# Patient Record
Sex: FEMALE | Employment: FULL TIME | URBAN - METROPOLITAN AREA
[De-identification: names, ages, dates, MRNs, and addresses within clinical notes are randomized per-mention and may not be internally consistent; named-entity substitution may affect disease eponyms.]

---

## 2024-08-12 ENCOUNTER — OFFICE VISIT (OUTPATIENT)
Dept: INTERNAL MEDICINE CLINIC | Facility: CLINIC | Age: 34
End: 2024-08-12

## 2024-08-12 VITALS
WEIGHT: 263 LBS | SYSTOLIC BLOOD PRESSURE: 115 MMHG | HEIGHT: 66 IN | TEMPERATURE: 97.6 F | BODY MASS INDEX: 42.27 KG/M2 | DIASTOLIC BLOOD PRESSURE: 76 MMHG | HEART RATE: 76 BPM

## 2024-08-12 DIAGNOSIS — Z12.4 CERVICAL CANCER SCREENING: ICD-10-CM

## 2024-08-12 DIAGNOSIS — E66.01 CLASS 3 SEVERE OBESITY DUE TO EXCESS CALORIES WITH BODY MASS INDEX (BMI) OF 40.0 TO 44.9 IN ADULT, UNSPECIFIED WHETHER SERIOUS COMORBIDITY PRESENT (HCC): ICD-10-CM

## 2024-08-12 DIAGNOSIS — I87.2 VENOUS INSUFFICIENCY OF LOWER EXTREMITY: ICD-10-CM

## 2024-08-12 DIAGNOSIS — D68.9 COAGULATION DISORDER (HCC): Primary | ICD-10-CM

## 2024-08-12 DIAGNOSIS — I73.9 ARTERIAL INSUFFICIENCY OF LOWER EXTREMITY (HCC): ICD-10-CM

## 2024-08-12 PROBLEM — E66.813 CLASS 3 SEVERE OBESITY DUE TO EXCESS CALORIES WITH BODY MASS INDEX (BMI) OF 40.0 TO 44.9 IN ADULT (HCC): Status: ACTIVE | Noted: 2024-08-12

## 2024-08-12 PROBLEM — Z86.718 HISTORY OF DVT IN ADULTHOOD: Status: ACTIVE | Noted: 2024-08-12

## 2024-08-12 PROCEDURE — 99204 OFFICE O/P NEW MOD 45 MIN: CPT | Performed by: FAMILY MEDICINE

## 2024-08-12 RX ORDER — CLOPIDOGREL BISULFATE 75 MG/1
75 TABLET ORAL DAILY
COMMUNITY

## 2024-08-12 NOTE — PROGRESS NOTES
Ambulatory Visit  Name: Lindsay Oneil      : 1990      MRN: [id number]  Encounter Provider: Lisa Smith MD  Encounter Date: 2024   Encounter department: [facility]    Assessment & Plan   1. Coagulation disorder (HCC)  Assessment & Plan:  Information gathering was very difficult during this office visit , languages barrier ,it was extremely difficult to direct patient , chronology of her hospital doctor visits , vein surgery didn't line up and I had to go back and review with her multiple times   She was showing me words on her phone , venous and arterial insufficiency   She had ED visit 2023 CP , HA dizziness she had  + D dimer and then thromboelastogram testing . NO imaging of chest or legs done at that time She was told her blood was thick and that she had venous and arterial insufficiency  started blood thinner , warfarin or the like she was having labs monitored . She at some point had LE imaging and then LE vein surgery in 2023 She was kept on warfarin, was told she needed treatment for life , then later took it x 5 months then was taken off the medication . She saw a doctor 2 months ago , had updated labs + D dimer and additional labs fibrinogen abnormal She was started on Plavix and has been taking it daily   I will have her see hematology , she is agreeable to this referral placed   Orders:  -     Ambulatory Referral to Hematology / Oncology; Future  2. Class 3 severe obesity due to excess calories with body mass index (BMI) of 40.0 to 44.9 in adult, unspecified whether serious comorbidity present (HCC)  3. Venous insufficiency of lower extremity  -     Ambulatory Referral to Hematology / Oncology; Future  4. Arterial insufficiency of lower extremity (HCC)  -     Ambulatory Referral to Hematology / Oncology; Future  5. Cervical cancer screening  Assessment & Plan:  Pt is due for pap , gyn referral placed   Orders:  -     Ambulatory  Referral to Obstetrics / Gynecology; Future         History of Present Illness     HPINew pt here to establish care , interpretor # [id number] present during office visit moved here from she has hx DVT  7/17/2023 , she had CP , HA , dizziness and she was taken to ED , 4/21/23 she had elevated D dimer , she had further work up thrombo elastrogram blood tests , she was told her blood was thick she was told she had venous and  arterial insufficiency , she saw a specialist in the ED she had surgery veins taken out of both lower legs ( she had no leg pain when she went to ED )She didn't have CT of chest , she did have LE imaging later , she had the surgery 7/17/2023 she was in the hospital x 3 days. She was placed on blood thinner 4/2023 she stopped med 2 days prior to surgery in July 2023  , she was restarted on blood thinner 6 days post op ? Warfarin she had monitoring  of the medication She was told she would need treatment for life , then later took the med daily x 5  months , then it was stopped . She saw a doctor 2 months ago , she had labs repeated D dimer was elevated , and additional labs fibrinogen was abnormal and medication was resumed  pt is difficult to direct during   She was told she had this coagulation issue was a result   of COVID vaccine she had the [employer] 2 dose   Fam history negative for blood clots or coagulation disorder   Review of Systems   Constitutional:  Negative for chills and fever.   HENT:  Negative for ear pain and sore throat.    Eyes:  Negative for pain and visual disturbance.   Respiratory:  Negative for cough and shortness of breath.    Cardiovascular:  Negative for chest pain and palpitations.   Gastrointestinal:  Negative for abdominal pain and vomiting.   Genitourinary:  Negative for dysuria and hematuria.   Musculoskeletal:  Negative for arthralgias and back pain.   Skin:  Negative for color change and rash.   Neurological:  Negative for seizures and syncope.   All other systems  "reviewed and are negative.    Past Medical History:   Diagnosis Date    Clotting disorder (HCC)      Past Surgical History:   Procedure Laterality Date     SECTION       Family History   Problem Relation Age of Onset    Asthma Mother     No Known Problems Father     No Known Problems Son     No Known Problems Daughter     No Known Problems Daughter      Social History     Tobacco Use    Smoking status: Never     Passive exposure: Never    Smokeless tobacco: Never   Vaping Use    Vaping status: Never Used   Substance and Sexual Activity    Alcohol use: Never    Drug use: Never    Sexual activity: Not on file     Current Outpatient Medications on File Prior to Visit   Medication Sig    clopidogrel (PLAVIX) 75 mg tablet Take 75 mg by mouth daily Plavix 75 mg once a day     No Known Allergies    There is no immunization history on file for this patient.  Objective     /76 (BP Location: Right arm, Patient Position: Sitting, Cuff Size: Large)   Pulse 76   Temp 97.6 °F (36.4 °C) (Temporal)   Ht 5' 5.5\" (1.664 m)   Wt 119 kg (263 lb)   BMI 43.10 kg/m²     Physical Exam  Vitals and nursing note reviewed.   Constitutional:       General: She is not in acute distress.     Appearance: She is well-developed.      Comments: Skin with good color turgor , well hydrated ,no distress noted  obese    HENT:      Head: Normocephalic and atraumatic.      Right Ear: No decreased hearing noted.      Left Ear: No decreased hearing noted.   Eyes:      Conjunctiva/sclera: Conjunctivae normal.   Neck:      Thyroid: No thyromegaly.   Cardiovascular:      Rate and Rhythm: Normal rate and regular rhythm.      Heart sounds: Normal heart sounds. No murmur heard.  Pulmonary:      Effort: Pulmonary effort is normal. No respiratory distress.      Breath sounds: Normal breath sounds.   Abdominal:      Palpations: Abdomen is soft.      Tenderness: There is no abdominal tenderness.   Musculoskeletal:         General: No swelling.      " Cervical back: Neck supple.      Right lower leg: No swelling. No edema.      Left lower leg: No swelling. No edema.      Comments: Bilateral Medial lower legs scars from prior vein surgery   Lymphadenopathy:      Cervical:      Right cervical: No superficial cervical adenopathy.     Left cervical: No superficial cervical adenopathy.   Skin:     General: Skin is warm and dry.      Capillary Refill: Capillary refill takes less than 2 seconds.   Neurological:      Mental Status: She is alert.      Comments: Non focal exam    Psychiatric:         Attention and Perception: Attention normal.         Mood and Affect: Mood normal.         Speech: Speech normal.         Behavior: Behavior normal.         Thought Content: Thought content normal.

## 2024-08-13 PROBLEM — Z12.4 CERVICAL CANCER SCREENING: Status: ACTIVE | Noted: 2024-08-13

## 2024-08-13 PROBLEM — Z86.718 HISTORY OF DVT IN ADULTHOOD: Status: RESOLVED | Noted: 2024-08-12 | Resolved: 2024-08-13

## 2024-08-13 NOTE — ASSESSMENT & PLAN NOTE
Information gathering was very difficult during this office visit , languages barrier ,it was extremely difficult to direct patient , chronology of her hospital doctor visits , vein surgery didn't line up and I had to go back and review with her multiple times   She was showing me words on her phone , venous and arterial insufficiency   She had ED visit 4/2023 CP , HA dizziness she had  + D dimer and then thromboelastogram testing . NO imaging of chest or legs done at that time She was told her blood was thick and that she had venous and arterial insufficiency  started blood thinner , warfarin or the like she was having labs monitored . She at some point had LE imaging and then LE vein surgery in July 2023 She was kept on warfarin, was told she needed treatment for life , then later took it x 5 months then was taken off the medication . She saw a doctor 2 months ago , had updated labs + D dimer and additional labs fibrinogen abnormal She was started on Plavix and has been taking it daily   I will have her see hematology , she is agreeable to this referral placed

## 2024-09-12 PROBLEM — Z12.4 CERVICAL CANCER SCREENING: Status: RESOLVED | Noted: 2024-08-13 | Resolved: 2024-09-12

## 2024-10-09 ENCOUNTER — TELEPHONE (OUTPATIENT)
Dept: HEMATOLOGY ONCOLOGY | Facility: CLINIC | Age: 34
End: 2024-10-09

## 2024-10-09 NOTE — TELEPHONE ENCOUNTER
Left  for pt. Pt's consult needed to be scheduled w/ a MD not a PA/NP. Pt has been rs to Dr. Arvizu for 10/15 @ 1040 am. Still located at [facility]. Left [place] line # for pt to confirm or rs this appt.

## 2024-10-15 ENCOUNTER — APPOINTMENT (OUTPATIENT)
Dept: LAB | Facility: CLINIC | Age: 34
End: 2024-10-15

## 2024-10-15 ENCOUNTER — CONSULT (OUTPATIENT)
Dept: HEMATOLOGY ONCOLOGY | Facility: CLINIC | Age: 34
End: 2024-10-15

## 2024-10-15 VITALS
DIASTOLIC BLOOD PRESSURE: 78 MMHG | TEMPERATURE: 97.5 F | HEART RATE: 96 BPM | SYSTOLIC BLOOD PRESSURE: 120 MMHG | WEIGHT: 260 LBS | RESPIRATION RATE: 18 BRPM | BODY MASS INDEX: 43.32 KG/M2 | OXYGEN SATURATION: 97 % | HEIGHT: 65 IN

## 2024-10-15 DIAGNOSIS — I82.90 VTE (VENOUS THROMBOEMBOLISM): Primary | ICD-10-CM

## 2024-10-15 DIAGNOSIS — I87.2 VENOUS INSUFFICIENCY OF LOWER EXTREMITY: ICD-10-CM

## 2024-10-15 DIAGNOSIS — D68.9 COAGULATION DISORDER (HCC): ICD-10-CM

## 2024-10-15 DIAGNOSIS — I73.9 ARTERIAL INSUFFICIENCY OF LOWER EXTREMITY (HCC): ICD-10-CM

## 2024-10-15 DIAGNOSIS — N92.0 MENORRHAGIA WITH REGULAR CYCLE: ICD-10-CM

## 2024-10-15 DIAGNOSIS — I82.90 VTE (VENOUS THROMBOEMBOLISM): ICD-10-CM

## 2024-10-15 LAB
ALBUMIN SERPL BCG-MCNC: 4.1 G/DL (ref 3.5–5)
ALP SERPL-CCNC: 49 U/L (ref 34–104)
ALT SERPL W P-5'-P-CCNC: 17 U/L (ref 7–52)
ANION GAP SERPL CALCULATED.3IONS-SCNC: 9 MMOL/L (ref 4–13)
AST SERPL W P-5'-P-CCNC: 16 U/L (ref 13–39)
BASOPHILS # BLD AUTO: 0.01 THOUSANDS/ΜL (ref 0–0.1)
BASOPHILS NFR BLD AUTO: 0 % (ref 0–1)
BILIRUB SERPL-MCNC: 0.41 MG/DL (ref 0.2–1)
BUN SERPL-MCNC: 6 MG/DL (ref 5–25)
CALCIUM SERPL-MCNC: 8.9 MG/DL (ref 8.4–10.2)
CHLORIDE SERPL-SCNC: 106 MMOL/L (ref 96–108)
CO2 SERPL-SCNC: 25 MMOL/L (ref 21–32)
CREAT SERPL-MCNC: 0.48 MG/DL (ref 0.6–1.3)
EOSINOPHIL # BLD AUTO: 0.11 THOUSAND/ΜL (ref 0–0.61)
EOSINOPHIL NFR BLD AUTO: 2 % (ref 0–6)
ERYTHROCYTE [DISTWIDTH] IN BLOOD BY AUTOMATED COUNT: 13.5 % (ref 11.6–15.1)
FERRITIN SERPL-MCNC: 14 NG/ML (ref 11–307)
GFR SERPL CREATININE-BSD FRML MDRD: 130 ML/MIN/1.73SQ M
GLUCOSE SERPL-MCNC: 79 MG/DL (ref 65–140)
HCT VFR BLD AUTO: 37.2 % (ref 34.8–46.1)
HGB BLD-MCNC: 12.4 G/DL (ref 11.5–15.4)
IMM GRANULOCYTES # BLD AUTO: 0.02 THOUSAND/UL (ref 0–0.2)
IMM GRANULOCYTES NFR BLD AUTO: 0 % (ref 0–2)
IRON SATN MFR SERPL: 17 % (ref 15–50)
IRON SERPL-MCNC: 71 UG/DL (ref 50–212)
LYMPHOCYTES # BLD AUTO: 2 THOUSANDS/ΜL (ref 0.6–4.47)
LYMPHOCYTES NFR BLD AUTO: 38 % (ref 14–44)
MCH RBC QN AUTO: 28.7 PG (ref 26.8–34.3)
MCHC RBC AUTO-ENTMCNC: 33.3 G/DL (ref 31.4–37.4)
MCV RBC AUTO: 86 FL (ref 82–98)
MONOCYTES # BLD AUTO: 0.34 THOUSAND/ΜL (ref 0.17–1.22)
MONOCYTES NFR BLD AUTO: 6 % (ref 4–12)
NEUTROPHILS # BLD AUTO: 2.86 THOUSANDS/ΜL (ref 1.85–7.62)
NEUTS SEG NFR BLD AUTO: 54 % (ref 43–75)
NRBC BLD AUTO-RTO: 0 /100 WBCS
PLATELET # BLD AUTO: 268 THOUSANDS/UL (ref 149–390)
PMV BLD AUTO: 11.9 FL (ref 8.9–12.7)
POTASSIUM SERPL-SCNC: 4 MMOL/L (ref 3.5–5.3)
PROT SERPL-MCNC: 7.4 G/DL (ref 6.4–8.4)
RBC # BLD AUTO: 4.32 MILLION/UL (ref 3.81–5.12)
SODIUM SERPL-SCNC: 140 MMOL/L (ref 135–147)
TIBC SERPL-MCNC: 428 UG/DL (ref 250–450)
UIBC SERPL-MCNC: 357 UG/DL (ref 155–355)
WBC # BLD AUTO: 5.34 THOUSAND/UL (ref 4.31–10.16)

## 2024-10-15 PROCEDURE — 80053 COMPREHEN METABOLIC PANEL: CPT

## 2024-10-15 PROCEDURE — 85613 RUSSELL VIPER VENOM DILUTED: CPT

## 2024-10-15 PROCEDURE — 85732 THROMBOPLASTIN TIME PARTIAL: CPT

## 2024-10-15 PROCEDURE — 85705 THROMBOPLASTIN INHIBITION: CPT

## 2024-10-15 PROCEDURE — 86146 BETA-2 GLYCOPROTEIN ANTIBODY: CPT

## 2024-10-15 PROCEDURE — 83550 IRON BINDING TEST: CPT

## 2024-10-15 PROCEDURE — 99244 OFF/OP CNSLTJ NEW/EST MOD 40: CPT | Performed by: INTERNAL MEDICINE

## 2024-10-15 PROCEDURE — 85670 THROMBIN TIME PLASMA: CPT

## 2024-10-15 PROCEDURE — 83540 ASSAY OF IRON: CPT

## 2024-10-15 PROCEDURE — 82728 ASSAY OF FERRITIN: CPT

## 2024-10-15 PROCEDURE — 85025 COMPLETE CBC W/AUTO DIFF WBC: CPT

## 2024-10-15 PROCEDURE — 86147 CARDIOLIPIN ANTIBODY EA IG: CPT

## 2024-10-15 PROCEDURE — 36415 COLL VENOUS BLD VENIPUNCTURE: CPT

## 2024-10-15 NOTE — PROGRESS NOTES
Ambulatory Visit  Name: Lindsay Oneil      : 1990      MRN: [id number]  Encounter Provider: Buffy Arvizu MD  Encounter Date: 10/15/2024   Encounter department: [facility] [place]    Assessment & Plan  VTE (venous thromboembolism)         Venous insufficiency of lower extremity         Arterial insufficiency of lower extremity (HCC)           History of Present Illness   {?Quick Links Encounters * My Last Note * Last Note in Specialty * Snapshot * Since Last Visit * History :35676}  Lindsay Oneil is a 33 y.o. female who presents ***      Episode of CP, HA, dizziness evaluated in the ED on 23. She had elevated D dimer, she had further work up including thrombo elastrogram blood tests. She was told that her blood was hypercoagulable. Also, she was told she had venous and  arterial insufficiency. Initiated systemic anticoagulation in 2023   History of DVT on 2023. Admitted to the hospital for 3 days surgical procedure of the LE venous system. Two days before surgery anticoagulation was held. Re-started anticoagulation on post-op day 6.  She was prescribed Warfarin. She was told she would need lifelong anticoagulation. Subsequently, she took Warfarin daily x 5  months, then it was stopped. She was told she had hypercoagulability secondary to COVID-19 vaccination ([employer] vaccine X2 doses)     {History obtained from (Optional):83690}  Review of Systems  {Select to Display PMH (Optional):92018}      Objective   {?Quick Links Trend Vitals * Enter New Vitals * Results Review * Timeline (Adult) * Labs * Imaging * Cardiology * Procedures * Lung Cancer Screening * Surgical eConsent :56085}  There were no vitals taken for this visit.    Physical Exam  {Administrative / Billing Section (Optional):00214}

## 2024-10-15 NOTE — PROGRESS NOTES
HEMATOLOGY ONCOLOGY STAFF PHYSICIAN ATTESTATION   I have personally interviewed and examined Lindsay Oneil with  Dr. Sonu Velasquez. I have reviewed and discussed the HPI, assessment, and oncologic management plan formulated by Dr. Velasquez. I concur with his assessment and management plan.    HPI was obtained form the patient in [place] [facility]. No previous medical records for review.    In summary, Mrs. Mcduffie is a 33-year-old  female referred to hematology clinic for diagnostic workup of acquired hypercoagulable state.  In summary, the patient refers an episode of right lower extremity DVT, unprovoked in April 2023.  At the time she was admitted to a hospital in the [facility].  She initiated systemic anticoagulation with unfractionated heparin and started oral apixaban as well.  She was discharged from the hospital on oral apixaban and was advised regarding the need for surgical resection of the saphenous veins bilaterally.  Sometime in July 2023, the patient was readmitted to the hospital for resection of the right and left saphenous veins.  She had extensive lower extremity vascular surgery (bilateral resection of saphenous veins) at a hospital in [facility]. On post-operative day 6, the patient developed a venous thromboembolic event with presence of a blood clot in the heart and angina pectoris. She was informed that she had developed unstable angina/acute coronary artery ischemia. Patient received systemic anticoagulation with unfractionated heparin and after clinical stabilization she was discharged to continue treatment with apixaban.  For the past 3 months she has been off apixiban and has been treated with the antiplatelet agent  (clopidogrel) Plavix as she was recently found to have elevation of the D-dimer. After July 2023, the patient has not had further episodes of acute venous thromboembolic disease or acute arterial thrombotic events.    Assessment and Plan:  33-year-old   female with history of unprovoked venous thromboembolic disease of the right lower extremity and by history probable ischemia of the coronary arteries/angina pectoris/intracardiac thrombus.  Her clinical presentation is suggestive of an acquired hypercoagulable state. She has indication for testing for antiphospholipid antibody syndrome. She does not have family history of hypercoagulability and therefore she does not have indication for testing for familial hypercoagulable syndromes.  The patient will return to hematology clinic to review results of testing for antiphospholipid antibody syndrome in 4 weeks.  Currently, she does not have indication for systemic anticoagulation or antiplatelet therapy.    Patient understands and agrees with our management recommendations and plan of care. We answered questions to her satisfaction.

## 2024-10-15 NOTE — PROGRESS NOTES
Hematology/Oncology Outpatient Initial Consultation  Lindsay Oneil 33 y.o. female 1990 [id number]    Date:  10/15/2024  Initial Consultation: Coagulopathy  HPI:    33-year-old female morbid obesity BMI 43, venous insufficiency with prior lower extremity vein surgery 2023, arterial insufficiency of lower extremities, and history of bilateral lower extremity DVT presents for initial consultation for anticoagulation recommendation.    Patient initially presented to ED in [facility] 2023 with chief complaint chest pain, headache, and dizziness with noted positive D-dimer and patient reported ultrasound with bilateral significantly sized blood clots in her deep veins.  The patient stated that she was started on Eliquis until 2023 where she had lower extremity vascular surgery with vein removal, patient showed us pictures of lower extremity bruising status post procedure, she did note her procedure was complicated by chest pain unclear if was PE versus CAD requiring heparin infusion.  The patient eventually was transitioned off of full dose anticoagulant and has been taking only Plavix.    The patient stated during, clot she had no provoking factors including trauma, venous stasis, any acute illness/inflammatory symptoms.  She did note that she did receive her COVID-vaccine however this was a year prior to thrombosis episode.  The patient denies any blood clots prior to this.  She also denies any family history of DVT or PE.  She also denies being on any OCPs or pregnant at this time.    The patient does note that she still gets persistent headaches.  She denies any worsening swelling in her lower extremities or chest pain at this time.    ROS: Review of Systems   All other systems reviewed and are negative.      Past Medical History:   Diagnosis Date    Clotting disorder (HCC)        Past Surgical History:   Procedure Laterality Date     SECTION         Social History      Socioeconomic History    Marital status: Single     Spouse name: Not on file    Number of children: 3    Years of education: Not on file    Highest education level: Not on file   Occupational History    Not on file   Tobacco Use    Smoking status: Never     Passive exposure: Never    Smokeless tobacco: Never   Vaping Use    Vaping status: Never Used   Substance and Sexual Activity    Alcohol use: Never    Drug use: Never    Sexual activity: Not on file   Other Topics Concern    Not on file   Social History Narrative    Not on file     Social Determinants of Health     Financial Resource Strain: Low Risk  (8/12/2024)    Overall Financial Resource Strain (CARDIA)     Difficulty of Paying Living Expenses: Not hard at all   Food Insecurity: No Food Insecurity (8/12/2024)    Hunger Vital Sign     Worried About Running Out of Food in the Last Year: Never true     Ran Out of Food in the Last Year: Never true   Transportation Needs: No Transportation Needs (8/12/2024)    PRAPARE - Transportation     Lack of Transportation (Medical): No     Lack of Transportation (Non-Medical): No   Physical Activity: Not on file   Stress: Not on file   Social Connections: Not on file   Intimate Partner Violence: Not on file   Housing Stability: High Risk (8/12/2024)    Housing Stability Vital Sign     Unable to Pay for Housing in the Last Year: No     Number of Times Moved in the Last Year: 2     Homeless in the Last Year: No       Family History   Problem Relation Age of Onset    Asthma Mother     No Known Problems Father     No Known Problems Son     No Known Problems Daughter     No Known Problems Daughter        No Known Allergies      Current Outpatient Medications:     clopidogrel (PLAVIX) 75 mg tablet, Take 75 mg by mouth daily Plavix 75 mg once a day, Disp: , Rfl:       Physical Exam:  There were no vitals taken for this visit.    Physical Exam  Constitutional:       Appearance: She is obese. She is not ill-appearing.   HENT:     "  Head: Normocephalic and atraumatic.      Mouth/Throat:      Mouth: Mucous membranes are moist.   Eyes:      Extraocular Movements: Extraocular movements intact.      Pupils: Pupils are equal, round, and reactive to light.   Cardiovascular:      Rate and Rhythm: Normal rate and regular rhythm.   Pulmonary:      Effort: Pulmonary effort is normal.      Breath sounds: No wheezing or rales.   Abdominal:      General: Bowel sounds are normal. There is no distension.      Palpations: Abdomen is soft.   Musculoskeletal:         General: No swelling. Normal range of motion.      Cervical back: Normal range of motion.   Skin:     General: Skin is warm.   Neurological:      General: No focal deficit present.      Mental Status: She is oriented to person, place, and time.         Labs:  No results found for: \"WBC\", \"HGB\", \"HCT\", \"MCV\", \"PLT\"  No results found for any previous visit.        Imaging:  No imaging available in system     Assessment and Plan:    1. VTE (venous thromboembolism)  2. Venous insufficiency of lower extremity  3. Arterial insufficiency of lower extremity (HCC)  4. Coagulation disorder (HCC)  5. Menorrhagia with regular cycle      33-year-old female who presents for initial consultation for history of bilateral DVTs/2023 unprovoked it appears with no prior history or family history status post venous surgical intervention of bilateral lower extremities, currently has been on antiplatelet therapy with no signs of recurrent clot.    Discussed with patient given history of DVTs without a provoking factor would rule out antiphospholipid syndrome, ordered cardiolipin antibody, lupus anticoagulant, and beta-2 glycoprotein at this appointment.  Will also check D-dimer. Would defer additional hereditary testing including factor V Leiden given ethnicity also given no family history of blood clots.    Also instructed for patient to stop taking Plavix as feel there is no indication at this time to continue this " medication.    Given persistent headaches, will check CBC, CMP, iron panel given patient has been having heavy menses will rule out anemia, any protein gap, or iron deficiency without anemia.  If headaches persist will consider MRI brain at follow-up appointment.    Sonu Bo,   PGY 4 hematology/oncology fellow    Patient voiced understanding and agreement in the above discussion. Aware to contact our office with questions/symptoms in the interim.     This note has been generated by voice recognition software system.  Therefore, there may be spelling, grammar, and or syntax errors. Please contact if questions arise.

## 2024-10-17 LAB
APTT SCREEN TO CONFIRM RATIO: 1.11 RATIO (ref 0–1.34)
CONFIRM APTT/NORMAL: 42.8 SEC (ref 0–47.6)
LA PPP-IMP: NORMAL
SCREEN APTT: 33.4 SEC (ref 0–43.5)
SCREEN DRVVT: 41.3 SEC (ref 0–47)
THROMBIN TIME: 16.8 SEC (ref 0–23)

## 2024-10-18 LAB
B2 GLYCOPROT1 IGA SERPL IA-ACNC: 3.1
B2 GLYCOPROT1 IGG SERPL IA-ACNC: 1.3
B2 GLYCOPROT1 IGM SERPL IA-ACNC: <2.4
CARDIOLIPIN IGA SER IA-ACNC: 4.7
CARDIOLIPIN IGG SER IA-ACNC: 1.5
CARDIOLIPIN IGM SER IA-ACNC: 0.9

## 2024-11-12 ENCOUNTER — OFFICE VISIT (OUTPATIENT)
Dept: OBGYN CLINIC | Facility: CLINIC | Age: 34
End: 2024-11-12

## 2024-11-12 ENCOUNTER — OFFICE VISIT (OUTPATIENT)
Dept: HEMATOLOGY ONCOLOGY | Facility: CLINIC | Age: 34
End: 2024-11-12

## 2024-11-12 VITALS
SYSTOLIC BLOOD PRESSURE: 133 MMHG | DIASTOLIC BLOOD PRESSURE: 58 MMHG | HEART RATE: 76 BPM | BODY MASS INDEX: 42.82 KG/M2 | HEIGHT: 65 IN | WEIGHT: 257 LBS

## 2024-11-12 VITALS
DIASTOLIC BLOOD PRESSURE: 80 MMHG | RESPIRATION RATE: 18 BRPM | HEIGHT: 65 IN | HEART RATE: 89 BPM | WEIGHT: 261.4 LBS | OXYGEN SATURATION: 98 % | BODY MASS INDEX: 43.55 KG/M2 | SYSTOLIC BLOOD PRESSURE: 118 MMHG | TEMPERATURE: 97.8 F

## 2024-11-12 DIAGNOSIS — I87.2 VENOUS INSUFFICIENCY OF LOWER EXTREMITY: Primary | ICD-10-CM

## 2024-11-12 DIAGNOSIS — Z01.411 ENCOUNTER FOR WELL WOMAN EXAM WITH ABNORMAL FINDINGS: Primary | ICD-10-CM

## 2024-11-12 DIAGNOSIS — N92.0 MENORRHAGIA WITH REGULAR CYCLE: ICD-10-CM

## 2024-11-12 PROCEDURE — G0145 SCR C/V CYTO,THINLAYER,RESCR: HCPCS

## 2024-11-12 PROCEDURE — 99213 OFFICE O/P EST LOW 20 MIN: CPT | Performed by: OBSTETRICS & GYNECOLOGY

## 2024-11-12 PROCEDURE — 99213 OFFICE O/P EST LOW 20 MIN: CPT | Performed by: INTERNAL MEDICINE

## 2024-11-12 PROCEDURE — G0476 HPV COMBO ASSAY CA SCREEN: HCPCS

## 2024-11-12 NOTE — PROGRESS NOTES
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  Her clinical presentation is suggestive of an acquired hypercoagulable state. She has indication for testing for antiphospholipid antibody syndrome. She does not have family history of hypercoagulability and therefore she does not have indication for testing for familial hypercoagulable syndromes.  The patient will return to hematology clinic to review results of testing for antiphospholipid antibody syndrome in 4 weeks.  Currently, she does not have indication for systemic anticoagulation or antiplatelet therapy.    Interim assessment: On 10/15/2024 Lupus Anticoagulant testing was normal. Also, anti-beta 2 glycoprotein antibodies, and anti-cardiolipin antibodies were normal.  The patient does not have clinical or laboratory evidence of antiphospholipid antibody syndrome.  Today, she does not have new symptoms.  She does not have recent or recurrent venous thromboembolic events or episodes of arterial thrombosis.    Plan:  At this point in time, the patient does not have indication for lifelong systemic anticoagulation or antiplatelet therapy  Patient advised regarding lifestyle measures to avoid/minimize the risk of recurrent venous thromboembolic and arterial thrombotic events (avoid prolonged resting, regular exercise, and a balanced diet)  Return to hematology clinic in 1 year       Patient understands and agrees with our management recommendations and plan of care. We answered questions to her satisfaction.      History of Present Illness     Lindsay Mcduffie  Thad is a 34 y.o. female with acquired hypercoagulable state.  In summary, the patient refers an episode of right lower extremity DVT, unprovoked in April 2023.  At the time she was admitted to a hospital in the [facility].  She initiated systemic anticoagulation with unfractionated heparin and started oral apixaban as well.  She was discharged from the hospital on oral apixaban and was advised regarding the need for surgical resection of the saphenous veins bilaterally.  Sometime in July 2023, the patient was readmitted to the hospital for resection of the right and left saphenous veins.  She had extensive lower extremity vascular surgery (bilateral resection of saphenous veins) at a hospital in [facility]. On post-operative day 6, the patient developed a venous thromboembolic event with presence of a blood clot in the heart and angina pectoris. She was informed that she had developed unstable angina/acute coronary artery ischemia. Patient received systemic anticoagulation with unfractionated heparin and after clinical stabilization she was discharged to continue treatment with apixaban.  For the past 3 months she has been off apixiban and has been treated with the antiplatelet agent  (clopidogrel) Plavix as she was recently found to have elevation of the D-dimer. After July 2023, the patient has not had further episodes of acute venous thromboembolic disease or acute arterial thrombotic events.     Interim History: Today Ms. Mcduffie does not have new complaints.  She is doing well.  She denies new systemic, cardiorespiratory, gastrointestinal, gynecologic, genitourinary, musculoskeletal, or neurologic symptoms.  She refers unchanged heavy menstrual periods although she will seek care with with her gynecologist to address this medical issue.  On 10/15/2024 Lupus Anticoagulant testing was normal. Also, anti-beta 2 glycoprotein antibodies, and anti-cardiolipin antibodies were within normal limits.   The patient does not have clinical or laboratory evidence of antiphospholipid antibody syndrome.  She does not have recent or current evidence of recurrent venous thromboembolic events or arterial thrombotic events.         Review of Systems   Constitutional:  Negative for activity change, appetite change, chills, diaphoresis, fatigue, fever and unexpected weight change.   HENT:  Negative for congestion, dental problem, ear discharge, ear pain, facial swelling, hearing loss, mouth sores, nosebleeds, postnasal drip, rhinorrhea, sinus pain, sneezing, sore throat, tinnitus, trouble swallowing and voice change.    Eyes:  Negative for photophobia, pain, discharge, redness, itching and visual disturbance.   Respiratory:  Negative for apnea, cough, choking, chest tightness, shortness of breath, wheezing and stridor.    Cardiovascular:  Negative for chest pain, palpitations and leg swelling.   Gastrointestinal:  Negative for abdominal distention, abdominal pain, anal bleeding, blood in stool, constipation, diarrhea, nausea, rectal pain and vomiting.   Endocrine: Negative for cold intolerance and heat intolerance.   Genitourinary:  Negative for decreased urine volume, difficulty urinating, dysuria, enuresis, flank pain, frequency, hematuria and urgency.   Musculoskeletal:  Negative for arthralgias, back pain, gait problem, joint swelling, myalgias, neck pain and neck stiffness.   Skin:  Negative for color change, pallor, rash and wound.   Neurological:  Negative for dizziness, tremors, seizures, syncope, facial asymmetry, speech difficulty, weakness, light-headedness, numbness and headaches.   Hematological:  Negative for adenopathy. Does not bruise/bleed easily.   Psychiatric/Behavioral:  Negative for behavioral problems, confusion, dysphoric mood and sleep disturbance. The patient is not nervous/anxious.            Objective     /80 (BP Location: Left arm, Patient Position: Sitting, Cuff Size: Adult)   Pulse 89    "Temp 97.8 °F (36.6 °C) (Temporal)   Resp 18   Ht 5' 5\" (1.651 m)   Wt 119 kg (261 lb 6.4 oz)   SpO2 98%   BMI 43.50 kg/m²     Physical Exam  Vitals reviewed.   Constitutional:       General: She is not in acute distress.     Appearance: She is obese. She is not toxic-appearing.   HENT:      Head: Normocephalic and atraumatic.      Nose: Nose normal. No congestion.      Mouth/Throat:      Mouth: Mucous membranes are moist.      Pharynx: Oropharynx is clear. No oropharyngeal exudate or posterior oropharyngeal erythema.   Eyes:      General: No scleral icterus.     Extraocular Movements: Extraocular movements intact.      Conjunctiva/sclera: Conjunctivae normal.      Pupils: Pupils are equal, round, and reactive to light.   Cardiovascular:      Rate and Rhythm: Normal rate and regular rhythm.      Pulses: Normal pulses.      Heart sounds: Normal heart sounds. No murmur heard.     No friction rub. No gallop.   Pulmonary:      Effort: Pulmonary effort is normal. No respiratory distress.      Breath sounds: Normal breath sounds. No stridor. No wheezing, rhonchi or rales.   Chest:      Chest wall: No tenderness.   Abdominal:      General: Bowel sounds are normal. There is no distension.      Palpations: Abdomen is soft. There is no mass.      Tenderness: There is no abdominal tenderness. There is no right CVA tenderness, left CVA tenderness, guarding or rebound.      Hernia: No hernia is present.   Musculoskeletal:         General: No swelling, tenderness or deformity. Normal range of motion.      Cervical back: Normal range of motion and neck supple. No rigidity or tenderness.      Right lower leg: No edema.      Left lower leg: No edema.   Lymphadenopathy:      Cervical: No cervical adenopathy.   Skin:     General: Skin is warm and dry.      Capillary Refill: Capillary refill takes less than 2 seconds.      Coloration: Skin is not jaundiced or pale.      Findings: No bruising, erythema, lesion or rash. "   Neurological:      General: No focal deficit present.      Mental Status: She is alert and oriented to person, place, and time. Mental status is at baseline.      Cranial Nerves: No cranial nerve deficit.      Sensory: No sensory deficit.      Motor: No weakness.      Coordination: Coordination normal.      Gait: Gait normal.      Deep Tendon Reflexes: Reflexes normal.   Psychiatric:         Mood and Affect: Mood normal.         Behavior: Behavior normal.         Thought Content: Thought content normal.

## 2024-11-12 NOTE — ASSESSMENT & PLAN NOTE
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  Her clinical presentation is suggestive of an acquired hypercoagulable state. She has indication for testing for antiphospholipid antibody syndrome. She does not have family history of hypercoagulability and therefore she does not have indication for testing for familial hypercoagulable syndromes.  The patient will return to hematology clinic to review results of testing for antiphospholipid antibody syndrome in 4 weeks.  Currently, she does not have indication for systemic anticoagulation or antiplatelet therapy.    Interim assessment: On 10/15/2024 Lupus Anticoagulant testing was normal. Also, anti-beta 2 glycoprotein antibodies, and anti-cardiolipin antibodies were normal.  The patient does not have clinical or laboratory evidence of antiphospholipid antibody syndrome.  Today, she does not have new symptoms.  She does not have recent or recurrent venous thromboembolic events or episodes of arterial thrombosis.    Plan:  At this point in time, the patient does not have indication for lifelong systemic anticoagulation or antiplatelet therapy  Patient advised regarding lifestyle measures to avoid/minimize the risk of recurrent venous thromboembolic and arterial thrombotic events (avoid prolonged resting, regular exercise, and a balanced diet)  Return to hematology clinic in 1 year       Patient understands and agrees with our management recommendations and plan of care. We answered questions to her satisfaction.

## 2024-11-12 NOTE — PROGRESS NOTES
"OB/GYN VISIT  Lindsay Oneil  2024  10:56 AM    ASSESSMENT / PLAN:    Lindsay Oneil is a 34 y.o.  female presenting for menorrhagia and painful menses. She has always had heavy menses, but it seems to be getting worse. She has spotting and occasional bleeding between menses.     Assessment & Plan  Menorrhagia with regular cycle  TVUS to rule out uterine pathology  Not a candidate for OCPs due to hx of DVT  Not interested in depo provera due to risk of weight gain  Discussed Mirena IUD, she had a copper IUD in the past  She will return to review TVUS and have Mirena IUD placed  Encounter for well woman exam with abnormal findings  Due for pap smear, collected on exam today      SUBJECTIVE:    Lindsay Oneil is a 34 y.o.  female presenting for menorrhagia and painful menses. Underwent menarche when she was 11 years old, and periods have always been heavy. They now seem to be getting heavier and more painful. She now has spotting or bleeding in between monthly periods. Changes her pad 6 times daily. No concern for STIs. Discussed contraceptive options for bleeding including depo provera and Mirena IUD.     Past Medical History:   Diagnosis Date    Clotting disorder (HCC)        Past Surgical History:   Procedure Laterality Date     SECTION         OBJECTIVE:    Vitals:  Blood pressure 133/58, pulse 76, height 5' 5\" (1.651 m), weight 117 kg (257 lb), last menstrual period 10/17/2024.Body mass index is 42.77 kg/m².    Physical Exam:    Physical Exam  Constitutional:       General: She is not in acute distress.  Genitourinary:      Genitourinary Comments: Normal external female genitalia  No vaginal or cervical lesions identified  Moderate pooling of blood from menses        Right Adnexa: not tender and no mass present.     Left Adnexa: not tender and no mass present.     Uterus is not enlarged or tender.   HENT:      Head: Normocephalic.      " Nose: Nose normal.      Mouth/Throat:      Pharynx: Oropharynx is clear.   Eyes:      Conjunctiva/sclera: Conjunctivae normal.   Cardiovascular:      Rate and Rhythm: Normal rate.   Pulmonary:      Effort: Pulmonary effort is normal.   Abdominal:      Palpations: Abdomen is soft.      Tenderness: There is no abdominal tenderness.   Musculoskeletal:         General: No tenderness.      Right lower leg: No edema.      Left lower leg: No edema.   Neurological:      Mental Status: She is alert.   Skin:     General: Skin is warm and dry.      Coloration: Skin is not jaundiced.   Psychiatric:         Mood and Affect: Mood normal.         Behavior: Behavior normal.   Vitals reviewed.             Annetta Sandra MD  11/12/2024  10:56 AM

## 2024-11-12 NOTE — ASSESSMENT & PLAN NOTE
TVUS to rule out uterine pathology  Not a candidate for OCPs due to hx of DVT  Not interested in depo provera due to risk of weight gain  Discussed Mirena IUD, she had a copper IUD in the past  She will return to review TVUS and have Mirena IUD placed

## 2024-11-13 LAB
HPV HR 12 DNA CVX QL NAA+PROBE: NEGATIVE
HPV16 DNA CVX QL NAA+PROBE: NEGATIVE
HPV18 DNA CVX QL NAA+PROBE: NEGATIVE

## 2024-11-14 ENCOUNTER — HOSPITAL ENCOUNTER (OUTPATIENT)
Dept: RADIOLOGY | Facility: HOSPITAL | Age: 34
Discharge: HOME | End: 2024-11-14

## 2024-11-14 DIAGNOSIS — N92.0 MENORRHAGIA WITH REGULAR CYCLE: ICD-10-CM

## 2024-11-14 PROCEDURE — 76830 TRANSVAGINAL US NON-OB: CPT

## 2024-11-14 PROCEDURE — 76856 US EXAM PELVIC COMPLETE: CPT

## 2024-11-15 LAB
LAB AP GYN PRIMARY INTERPRETATION: NORMAL
LAB AP LMP: NORMAL
Lab: NORMAL

## 2024-11-21 ENCOUNTER — TELEPHONE (OUTPATIENT)
Dept: RADIOLOGY | Facility: HOSPITAL | Age: 34
End: 2024-11-21

## 2024-11-21 NOTE — TELEPHONE ENCOUNTER
Telephone Call    [x] Called Patient regarding [facility]; Patient enrolled to [facility].    [] Called Patient regarding [facility]; Patient answered call and wants to enroll; Asked for a call back.    [] Called Patient regarding [facility]; Patient answered call; Declined to enroll in program.    [] Called Patient regarding [facility]; Patient did not ; Left voicemail with my name and direct phone number.     [] Called Patient regarding [facility]; Patient did not ; Unable to leave voicemail.    [] Called Patient regarding [facility]; Phone number is invalid    Attempts (Max 3): [x]1   []2   []3        Additional Notes:

## 2024-11-26 ENCOUNTER — RESULTS FOLLOW-UP (OUTPATIENT)
Dept: LABOR AND DELIVERY | Facility: HOSPITAL | Age: 34
End: 2024-11-26

## 2024-12-27 ENCOUNTER — HOSPITAL ENCOUNTER (EMERGENCY)
Facility: HOSPITAL | Age: 34
Discharge: HOME | End: 2024-12-27
Attending: EMERGENCY MEDICINE

## 2024-12-27 ENCOUNTER — APPOINTMENT (EMERGENCY)
Dept: RADIOLOGY | Facility: HOSPITAL | Age: 34
End: 2024-12-27

## 2024-12-27 VITALS
RESPIRATION RATE: 18 BRPM | DIASTOLIC BLOOD PRESSURE: 79 MMHG | TEMPERATURE: 98.1 F | SYSTOLIC BLOOD PRESSURE: 147 MMHG | HEART RATE: 85 BPM | OXYGEN SATURATION: 99 %

## 2024-12-27 DIAGNOSIS — R07.9 CHEST PAIN: Primary | ICD-10-CM

## 2024-12-27 LAB
ALBUMIN SERPL BCG-MCNC: 4 G/DL (ref 3.5–5)
ALP SERPL-CCNC: 54 U/L (ref 34–104)
ALT SERPL W P-5'-P-CCNC: 16 U/L (ref 7–52)
ANION GAP SERPL CALCULATED.3IONS-SCNC: 8 MMOL/L (ref 4–13)
AST SERPL W P-5'-P-CCNC: 16 U/L (ref 13–39)
ATRIAL RATE: 81 BPM
BASOPHILS # BLD AUTO: 0.01 THOUSANDS/ÂΜL (ref 0–0.1)
BASOPHILS NFR BLD AUTO: 0 % (ref 0–1)
BILIRUB SERPL-MCNC: 0.33 MG/DL (ref 0.2–1)
BUN SERPL-MCNC: 10 MG/DL (ref 5–25)
CALCIUM SERPL-MCNC: 9.4 MG/DL (ref 8.4–10.2)
CARDIAC TROPONIN I PNL SERPL HS: <2 NG/L (ref ?–50)
CHLORIDE SERPL-SCNC: 105 MMOL/L (ref 96–108)
CO2 SERPL-SCNC: 26 MMOL/L (ref 21–32)
CREAT SERPL-MCNC: 0.55 MG/DL (ref 0.6–1.3)
EOSINOPHIL # BLD AUTO: 0.1 THOUSAND/ÂΜL (ref 0–0.61)
EOSINOPHIL NFR BLD AUTO: 2 % (ref 0–6)
ERYTHROCYTE [DISTWIDTH] IN BLOOD BY AUTOMATED COUNT: 13.6 % (ref 11.6–15.1)
GFR SERPL CREATININE-BSD FRML MDRD: 123 ML/MIN/1.73SQ M
GLUCOSE SERPL-MCNC: 83 MG/DL (ref 65–140)
HCT VFR BLD AUTO: 37.2 % (ref 34.8–46.1)
HGB BLD-MCNC: 12.1 G/DL (ref 11.5–15.4)
IMM GRANULOCYTES # BLD AUTO: 0.02 THOUSAND/UL (ref 0–0.2)
IMM GRANULOCYTES NFR BLD AUTO: 0 % (ref 0–2)
LYMPHOCYTES # BLD AUTO: 2.16 THOUSANDS/ÂΜL (ref 0.6–4.47)
LYMPHOCYTES NFR BLD AUTO: 31 % (ref 14–44)
MCH RBC QN AUTO: 28.3 PG (ref 26.8–34.3)
MCHC RBC AUTO-ENTMCNC: 32.5 G/DL (ref 31.4–37.4)
MCV RBC AUTO: 87 FL (ref 82–98)
MONOCYTES # BLD AUTO: 0.6 THOUSAND/ÂΜL (ref 0.17–1.22)
MONOCYTES NFR BLD AUTO: 9 % (ref 4–12)
NEUTROPHILS # BLD AUTO: 4 THOUSANDS/ÂΜL (ref 1.85–7.62)
NEUTS SEG NFR BLD AUTO: 58 % (ref 43–75)
NRBC BLD AUTO-RTO: 0 /100 WBCS
P AXIS: 45 DEGREES
PLATELET # BLD AUTO: 292 THOUSANDS/UL (ref 149–390)
PMV BLD AUTO: 11.2 FL (ref 8.9–12.7)
POTASSIUM SERPL-SCNC: 3.5 MMOL/L (ref 3.5–5.3)
PR INTERVAL: 156 MS
PROT SERPL-MCNC: 7.4 G/DL (ref 6.4–8.4)
QRS AXIS: 23 DEGREES
QRSD INTERVAL: 72 MS
QT INTERVAL: 372 MS
QTC INTERVAL: 432 MS
RBC # BLD AUTO: 4.28 MILLION/UL (ref 3.81–5.12)
SODIUM SERPL-SCNC: 139 MMOL/L (ref 135–147)
T WAVE AXIS: 22 DEGREES
VENTRICULAR RATE: 81 BPM
WBC # BLD AUTO: 6.89 THOUSAND/UL (ref 4.31–10.16)

## 2024-12-27 PROCEDURE — 80053 COMPREHEN METABOLIC PANEL: CPT | Performed by: EMERGENCY MEDICINE

## 2024-12-27 PROCEDURE — 93010 ELECTROCARDIOGRAM REPORT: CPT | Performed by: INTERNAL MEDICINE

## 2024-12-27 PROCEDURE — 71045 X-RAY EXAM CHEST 1 VIEW: CPT

## 2024-12-27 PROCEDURE — 99284 EMERGENCY DEPT VISIT MOD MDM: CPT | Performed by: EMERGENCY MEDICINE

## 2024-12-27 PROCEDURE — 84484 ASSAY OF TROPONIN QUANT: CPT | Performed by: EMERGENCY MEDICINE

## 2024-12-27 PROCEDURE — 36415 COLL VENOUS BLD VENIPUNCTURE: CPT

## 2024-12-27 PROCEDURE — 99285 EMERGENCY DEPT VISIT HI MDM: CPT

## 2024-12-27 PROCEDURE — 85025 COMPLETE CBC W/AUTO DIFF WBC: CPT | Performed by: EMERGENCY MEDICINE

## 2024-12-27 PROCEDURE — 93005 ELECTROCARDIOGRAM TRACING: CPT

## 2024-12-27 RX ORDER — IBUPROFEN 600 MG/1
600 TABLET, FILM COATED ORAL ONCE
Status: COMPLETED | OUTPATIENT
Start: 2024-12-27 | End: 2024-12-27

## 2024-12-27 RX ORDER — MAGNESIUM HYDROXIDE/ALUMINUM HYDROXICE/SIMETHICONE 120; 1200; 1200 MG/30ML; MG/30ML; MG/30ML
30 SUSPENSION ORAL ONCE
Status: COMPLETED | OUTPATIENT
Start: 2024-12-27 | End: 2024-12-27

## 2024-12-27 RX ORDER — ACETAMINOPHEN 325 MG/1
975 TABLET ORAL ONCE
Status: COMPLETED | OUTPATIENT
Start: 2024-12-27 | End: 2024-12-27

## 2024-12-27 RX ORDER — METHOCARBAMOL 500 MG/1
500 TABLET, FILM COATED ORAL ONCE
Status: COMPLETED | OUTPATIENT
Start: 2024-12-27 | End: 2024-12-27

## 2024-12-27 RX ADMIN — ALUMINUM HYDROXIDE, MAGNESIUM HYDROXIDE, AND SIMETHICONE 30 ML: 200; 200; 20 SUSPENSION ORAL at 16:42

## 2024-12-27 RX ADMIN — METHOCARBAMOL 500 MG: 500 TABLET ORAL at 16:42

## 2024-12-27 RX ADMIN — ACETAMINOPHEN 975 MG: 325 TABLET, FILM COATED ORAL at 16:42

## 2024-12-27 RX ADMIN — IBUPROFEN 600 MG: 600 TABLET, FILM COATED ORAL at 16:42

## 2024-12-27 NOTE — ED ATTENDING ATTESTATION
12/27/2024  I, Alvin Muro DO, saw and evaluated the patient. I have discussed the patient with the resident/non-physician practitioner and agree with the resident's/non-physician practitioner's findings, Plan of Care, and MDM as documented in the resident's/non-physician practitioner's note, except where noted. All available labs and Radiology studies were reviewed.  I was present for key portions of any procedure(s) performed by the resident/non-physician practitioner and I was immediately available to provide assistance.       At this point I agree with the current assessment done in the Emergency Department.  I have conducted an independent evaluation of this patient a history and physical is as follows:    34 yof with cp. Central. Reproducible. Previous cough.   Hx VTE? In [place] but had full workup by heme, negative.   Plan cxr, EKG, trop, dc likely costochondritis, doubt acs, pneumonia    ED Course         Critical Care Time  Procedures

## 2024-12-27 NOTE — Clinical Note
Lindsay Oneil was seen and treated in our emergency department on 12/27/2024.                Diagnosis:     Lindsay  is off the rest of the shift today, may return to work on return date.    She may return on this date: 12/30/2024         If you have any questions or concerns, please don't hesitate to call.      Cristiano Chahal, DO    ______________________________           _______________          _______________  Hospital Representative                              Date                                Time

## 2024-12-27 NOTE — ED PROVIDER NOTES
ED Disposition       None          Assessment & Plan   {Hyperlinks  Risk Stratification - NIHSS - HEART SCORE - Fill out sepsis note and make sure you call [phone] if severe or septic shock:[id number]}    Medical Decision Making  Patient is a 34 y.o. female with PMH of class III obesity, coagulation disorder, who presents to the ED with complaint of chest pain    Vital signs on arrival within normal limits  On exam on exam patient is alert, oriented, no evidence respiratory distress, has reproducible central chest discomfort, see physical exam for additional details    History and physical exam most consistent with costochondritis, reflux disease, however, differential diagnosis included but not limited to pneumonia, pneumothorax, doubt reactive airway disease, doubt PE, plan ***    View ED course above for further discussion on patient workup.     Per chart review, patient had a history of venous thromboembolic event in 2023, upon telling her new primary care doctor about this she was placed on clopidogrel, patient initially was treated with heparin and placed on Coumadin in 2023, she followed through with Plavix treatment until being able to see hematology earlier this year, hematology did a battery of testing for pro clotting disorders, patient returned negative laboratory evaluation of all pro clotting disorders, was taken off of apixaban, found by hematology to have no evidence of need for lifelong treatment with blood thinner, only chronic diagnosis at this time is venous insufficiency of the lower extremities, patient continues without lower extremity swelling or complaint of pain today, no change in work of breathing or vitals    All labs reviewed and utilized in the medical decision making process  All radiology studies independently viewed by me and interpreted by the radiologist.  I reviewed all testing with the patient.     Upon re-evaluation ***      Risk  OTC drugs.  Prescription drug  management.      ED Course as of 24 1611   Fri Dec 27, 2024   1555 Chest pain, very bad hurting, months w/ issue, at work left to the er, wants information and better pain control, will get headaches, not sleep sometimes, motrin / tylenol does not help, center of the chest, pressure, waxes and wanes, -asthma, -fevers, had reflux symptoms yesterday, no recent fall / strain / trauma, 7/10 right now       Medications - No data to display    ED Risk Strat Scores                                              History of Present Illness   {Hyperlinks  History (Med, Surg, Fam, Social) - Current Medications - Allergies  :[id number]}    Chief Complaint   Patient presents with   • Chest Pain     Patient reports chest pain and headache that started yesterday.        Past Medical History:   Diagnosis Date   • Clotting disorder (HCC)       Past Surgical History:   Procedure Laterality Date   •  SECTION        Family History   Problem Relation Age of Onset   • Asthma Mother    • No Known Problems Father    • No Known Problems Son    • No Known Problems Daughter    • No Known Problems Daughter       Social History     Tobacco Use   • Smoking status: Never     Passive exposure: Never   • Smokeless tobacco: Never   Vaping Use   • Vaping status: Never Used   Substance Use Topics   • Alcohol use: Never   • Drug use: Never      E-Cigarette/Vaping   • E-Cigarette Use Never User       E-Cigarette/Vaping Substances   • Nicotine No    • THC No    • CBD No    • Flavoring No    • Other No    • Unknown No       I have reviewed and agree with the history as documented.     HPI    Review of Systems        Objective   {Hyperlinks  Historical Vitals - Historical Labs - Chart Review/Microbiology - Last Echo - Code Status  :[id number]}    ED Triage Vitals [24 1401]   Temperature Pulse Blood Pressure Respirations SpO2 Patient Position - Orthostatic VS   98.1 °F (36.7 °C) 85 147/79 18 99 % Sitting      Temp Source Heart Rate  Source BP Location FiO2 (%) Pain Score    Temporal Monitor Left arm -- --      Vitals      Date and Time Temp Pulse SpO2 Resp BP Pain Score FACES Pain Rating User   12/27/24 1401 98.1 °F (36.7 °C) 85 99 % 18 147/79 -- -- ST            Physical Exam    Results Reviewed       Procedure Component Value Units Date/Time    HS Troponin I 4hr [[id number]]     Lab Status: No result Specimen: Blood     HS Troponin I 2hr [[id number]]     Lab Status: No result Specimen: Blood     HS Troponin 0hr (reflex protocol) [[id number]]  (Normal) Collected: 12/27/24 1408    Lab Status: Final result Specimen: Blood from Arm, Left Updated: 12/27/24 1446     hs TnI 0hr <2 ng/L     Comprehensive metabolic panel [[id number]]  (Abnormal) Collected: 12/27/24 1408    Lab Status: Final result Specimen: Blood from Arm, Left Updated: 12/27/24 1443     Sodium 139 mmol/L      Potassium 3.5 mmol/L      Chloride 105 mmol/L      CO2 26 mmol/L      ANION GAP 8 mmol/L      BUN 10 mg/dL      Creatinine 0.55 mg/dL      Glucose 83 mg/dL      Calcium 9.4 mg/dL      AST 16 U/L      ALT 16 U/L      Alkaline Phosphatase 54 U/L      Total Protein 7.4 g/dL      Albumin 4.0 g/dL      Total Bilirubin 0.33 mg/dL      eGFR 123 ml/min/1.73sq m     Narrative:      National Kidney Disease Foundation guidelines for Chronic Kidney Disease (CKD):   •  Stage 1 with normal or high GFR (GFR > 90 mL/min/1.73 square meters)  •  Stage 2 Mild CKD (GFR = 60-89 mL/min/1.73 square meters)  •  Stage 3A Moderate CKD (GFR = 45-59 mL/min/1.73 square meters)  •  Stage 3B Moderate CKD (GFR = 30-44 mL/min/1.73 square meters)  •  Stage 4 Severe CKD (GFR = 15-29 mL/min/1.73 square meters)  •  Stage 5 End Stage CKD (GFR <15 mL/min/1.73 square meters)  Note: GFR calculation is accurate only with a steady state creatinine    CBC and differential [[id number]] Collected: 12/27/24 1408    Lab Status: Final result Specimen: Blood from Arm, Left Updated: 12/27/24 1422     WBC 6.89 Thousand/uL      RBC  4.28 Million/uL      Hemoglobin 12.1 g/dL      Hematocrit 37.2 %      MCV 87 fL      MCH 28.3 pg      MCHC 32.5 g/dL      RDW 13.6 %      MPV 11.2 fL      Platelets 292 Thousands/uL      nRBC 0 /100 WBCs      Segmented % 58 %      Immature Grans % 0 %      Lymphocytes % 31 %      Monocytes % 9 %      Eosinophils Relative 2 %      Basophils Relative 0 %      Absolute Neutrophils 4.00 Thousands/µL      Absolute Immature Grans 0.02 Thousand/uL      Absolute Lymphocytes 2.16 Thousands/µL      Absolute Monocytes 0.60 Thousand/µL      Eosinophils Absolute 0.10 Thousand/µL      Basophils Absolute 0.01 Thousands/µL             XR chest 1 view portable    (Results Pending)       Procedures    ED Medication and Procedure Management   Prior to Admission Medications   Prescriptions Last Dose Informant Patient Reported? Taking?   clopidogrel (PLAVIX) 75 mg tablet  Self Yes No   Sig: Take 75 mg by mouth daily Plavix 75 mg once a day   Patient not taking: Reported on 11/12/2024      Facility-Administered Medications: None     Patient's Medications   Discharge Prescriptions    No medications on file     No discharge procedures on file.  ED SEPSIS DOCUMENTATION              ANION GAP 8 mmol/L      BUN 10 mg/dL      Creatinine 0.55 mg/dL      Glucose 83 mg/dL      Calcium 9.4 mg/dL      AST 16 U/L      ALT 16 U/L      Alkaline Phosphatase 54 U/L      Total Protein 7.4 g/dL      Albumin 4.0 g/dL      Total Bilirubin 0.33 mg/dL      eGFR 123 ml/min/1.73sq m     Narrative:      National Kidney Disease Foundation guidelines for Chronic Kidney Disease (CKD):     Stage 1 with normal or high GFR (GFR > 90 mL/min/1.73 square meters)    Stage 2 Mild CKD (GFR = 60-89 mL/min/1.73 square meters)    Stage 3A Moderate CKD (GFR = 45-59 mL/min/1.73 square meters)    Stage 3B Moderate CKD (GFR = 30-44 mL/min/1.73 square meters)    Stage 4 Severe CKD (GFR = 15-29 mL/min/1.73 square meters)    Stage 5 End Stage CKD (GFR <15 mL/min/1.73 square meters)  Note: GFR calculation is accurate only with a steady state creatinine    CBC and differential [[id number]] Collected: 12/27/24 1408    Lab Status: Final result Specimen: Blood from Arm, Left Updated: 12/27/24 1422     WBC 6.89 Thousand/uL      RBC 4.28 Million/uL      Hemoglobin 12.1 g/dL      Hematocrit 37.2 %      MCV 87 fL      MCH 28.3 pg      MCHC 32.5 g/dL      RDW 13.6 %      MPV 11.2 fL      Platelets 292 Thousands/uL      nRBC 0 /100 WBCs      Segmented % 58 %      Immature Grans % 0 %      Lymphocytes % 31 %      Monocytes % 9 %      Eosinophils Relative 2 %      Basophils Relative 0 %      Absolute Neutrophils 4.00 Thousands/µL      Absolute Immature Grans 0.02 Thousand/uL      Absolute Lymphocytes 2.16 Thousands/µL      Absolute Monocytes 0.60 Thousand/µL      Eosinophils Absolute 0.10 Thousand/µL      Basophils Absolute 0.01 Thousands/µL             XR chest 1 view portable   Final Interpretation by Bethany Dumont MD (12/28 0546)      No acute cardiopulmonary disease.            Workstation performed: [id number]             Procedures    ED Medication and Procedure Management   Prior to Admission Medications   Prescriptions Last Dose  Informant Patient Reported? Taking?   clopidogrel (PLAVIX) 75 mg tablet  Self Yes No   Sig: Take 75 mg by mouth daily Plavix 75 mg once a day   Patient not taking: Reported on 11/12/2024      Facility-Administered Medications: None     Discharge Medication List as of 12/27/2024  4:43 PM        CONTINUE these medications which have NOT CHANGED    Details   clopidogrel (PLAVIX) 75 mg tablet Take 75 mg by mouth daily Plavix 75 mg once a day, Historical Med           No discharge procedures on file.  ED SEPSIS DOCUMENTATION   Time reflects when diagnosis was documented in both MDM as applicable and the Disposition within this note       Time User Action Codes Description Comment    12/27/2024  4:40 PM Cristiano Chahal Add [R07.9] Chest pain                  Cristiano Chahal,   01/05/25 0729       Cristiano Chahal DO  01/05/25 0729

## 2025-02-20 ENCOUNTER — OFFICE VISIT (OUTPATIENT)
Dept: CARDIOLOGY CLINIC | Facility: CLINIC | Age: 35
End: 2025-02-20

## 2025-02-20 VITALS
HEART RATE: 84 BPM | OXYGEN SATURATION: 98 % | DIASTOLIC BLOOD PRESSURE: 78 MMHG | BODY MASS INDEX: 43.77 KG/M2 | WEIGHT: 263 LBS | SYSTOLIC BLOOD PRESSURE: 128 MMHG

## 2025-02-20 DIAGNOSIS — E66.813 CLASS 3 SEVERE OBESITY DUE TO EXCESS CALORIES WITH BODY MASS INDEX (BMI) OF 40.0 TO 44.9 IN ADULT, UNSPECIFIED WHETHER SERIOUS COMORBIDITY PRESENT (HCC): ICD-10-CM

## 2025-02-20 DIAGNOSIS — R07.89 OTHER CHEST PAIN: ICD-10-CM

## 2025-02-20 DIAGNOSIS — E66.01 CLASS 3 SEVERE OBESITY DUE TO EXCESS CALORIES WITH BODY MASS INDEX (BMI) OF 40.0 TO 44.9 IN ADULT, UNSPECIFIED WHETHER SERIOUS COMORBIDITY PRESENT (HCC): ICD-10-CM

## 2025-02-20 DIAGNOSIS — Z13.220 SCREENING, LIPID: Primary | ICD-10-CM

## 2025-02-20 PROCEDURE — 99204 OFFICE O/P NEW MOD 45 MIN: CPT | Performed by: STUDENT IN AN ORGANIZED HEALTH CARE EDUCATION/TRAINING PROGRAM

## 2025-02-20 NOTE — PROGRESS NOTES
[facility]  [address]  [place] [address]  Phone#  [phone]  Fax#  [phone]  [facility] Cardiology Office Consultation             NAME: Lindsay Oneil  AGE: 34 y.o. SEX: female   : 1990   MRN: [id number]    DATE: 2025  TIME: 3:17 PM    Assessment & Plan  Other chest pain  Patient's chest pain has improved since her emergency room visit.  Likely costochondritis per her description and review of notes.  Discussed that she should call the office if she has any return in symptoms.  She is healing without significant risk factors for coronary artery disease other than weight.  Will not pursue further ischemic testing at this time, but will consider symptoms change.  Screening, lipid  We will obtain lipid panel.  Class 3 severe obesity due to excess calories with body mass index (BMI) of 40.0 to 44.9 in adult, unspecified whether serious comorbidity present (HCC)  Recommend heart healthy diet.  Regular exercise.      Follow up as needed       ------     Chief Complaint   Patient presents with    New Patient Visit     No cardiac concerns or complaints  Referred by ED       HPI:    Lindsay Oneil is a 34 y.o.-year-old female who presents to the cardiology clinic for chest pain.    Past medical history significant for obesity and questionable coagulation disorder.    I reviewed her prior records in epic.  She had a history of a DVT in  while she was in a hospital in the [facility].  Reportedly had right lower extremity DVT.  She was treated with apixaban.  She had presented to the hospital a few months later for saphenous vein resection.  During that hospitalization, she developed a DVT.  Notes from her hematologist on 2024 suggest she was told she had a blood clot in her heart.    History was obtained with  [id number].    She reports that she had presented to the emergency room/ for substernal  chest pain.  It was not brought on by exertion, not relieved with rest.  Reported a sharp and pressure-like pain that did not radiate.  It was constant for several hours.  She had normal troponins and ECG.  ER notes suggested costochondritis versus GERD, and she was discharged home with recommendations to follow-up with cardiology.    Today, she reports she had no additional episodes of chest pain since that ER visit.  She denies chest pain, chest discomfort, shortness of breath at rest, dyspnea on exertion.    I asked her about the hematology note from 2024 suggesting blood clot in her heart.  She reports that she was told she had a blood clot somewhere, but they were unable to locate where it was.  She does not remember being told she had a blood clot in her heart.  She reports that she was compliant with her anticoagulation until she was taken off of it at this hematology appointment.  She denies any known family history of cardiovascular disease.  She does not smoke.        ------    I personally reviewed the patient's pertinent cardiac imaging and labs in [facility]:    24 ECG - normal sinus rhythm, normal ECG  24 troponin - normal  24 CMP - low creatinine, otherwise normal  24 CBC - normal    10/15/24 beta 2 glycoprotein, cardiolipin ab, lupus anticoagulant - all normal    -----    Past history, family history, social history, current medications, vital signs, recent lab and imaging studies and  prior cardiology studies reviewed independently on this visit.    No Known Allergies    Current Outpatient Medications:     clopidogrel (PLAVIX) 75 mg tablet, Take 75 mg by mouth daily Plavix 75 mg once a day, Disp: , Rfl:     Past Medical History:   Diagnosis Date    Clotting disorder (HCC)      Past Surgical History:   Procedure Laterality Date     SECTION       Family History   Problem Relation Age of Onset    Asthma Mother     No Known Problems Father     No Known Problems Son     No  "Known Problems Daughter     No Known Problems Daughter        Social History   reports that she has never smoked. She has never been exposed to tobacco smoke. She has never used smokeless tobacco. She reports that she does not drink alcohol and does not use drugs.     Review of Systems   Constitutional:  Negative for fatigue.   Respiratory:  Negative for chest tightness and shortness of breath.    Cardiovascular:  Negative for chest pain, palpitations and leg swelling.   Neurological:  Negative for dizziness and light-headedness.       Objective:     Vitals:    02/20/25 1512   BP: 128/78   Pulse: 84   SpO2: 98%     Physical Exam  Vitals reviewed.   Constitutional:       General: She is not in acute distress.     Appearance: She is well-developed. She is obese.   HENT:      Head: Normocephalic and atraumatic.   Eyes:      Conjunctiva/sclera: Conjunctivae normal.   Cardiovascular:      Rate and Rhythm: Normal rate and regular rhythm.      Heart sounds: No murmur heard.  Pulmonary:      Effort: Pulmonary effort is normal. No respiratory distress.      Breath sounds: Normal breath sounds.   Musculoskeletal:         General: No swelling.   Skin:     General: Skin is warm and dry.   Neurological:      Mental Status: She is alert.   Psychiatric:         Mood and Affect: Mood normal.         Pertinent Laboratory/Diagnostic Studies:    Laboratory studies reviewed personally by Ashley Fried MD    BMP:   Lab Results   Component Value Date    SODIUM 139 12/27/2024    K 3.5 12/27/2024     12/27/2024    CO2 26 12/27/2024    BUN 10 12/27/2024    CREATININE 0.55 (L) 12/27/2024    GLUC 83 12/27/2024    CALCIUM 9.4 12/27/2024     CBC:  Lab Results   Component Value Date    WBC 6.89 12/27/2024    HGB 12.1 12/27/2024    HCT 37.2 12/27/2024    MCV 87 12/27/2024     12/27/2024     Lipid Profile:   No results found for: \"HDL\"  No results found for: \"LDLCALC\"  No results found for: \"TRIG\"   Other labs:  No results found for: " "\"[id number]\", \"TSH\"  Lab Results   Component Value Date    ALT 16 12/27/2024    AST 16 12/27/2024       Imaging Studies:     Pertinent cardiac studies and imaging studies were personally reviewed on this visit and results summarized above.    Ashley Fried MD, PhD    Portions of the record may have been created with voice recognition software.  Occasional wrong word or \"sound alike\" substitutions may have occurred due to the inherent limitations of voice recognition software.  Read the chart carefully and recognize, using context, where substitutions have occurred. Please reach out to me directly for any clarifications.   "

## 2025-03-27 ENCOUNTER — TELEPHONE (OUTPATIENT)
Dept: INTERNAL MEDICINE CLINIC | Facility: CLINIC | Age: 35
End: 2025-03-27

## 2025-03-27 ENCOUNTER — OFFICE VISIT (OUTPATIENT)
Dept: INTERNAL MEDICINE CLINIC | Facility: CLINIC | Age: 35
End: 2025-03-27

## 2025-03-27 VITALS
DIASTOLIC BLOOD PRESSURE: 85 MMHG | TEMPERATURE: 98.3 F | HEART RATE: 85 BPM | BODY MASS INDEX: 45.15 KG/M2 | HEIGHT: 65 IN | SYSTOLIC BLOOD PRESSURE: 138 MMHG | WEIGHT: 271 LBS

## 2025-03-27 DIAGNOSIS — G47.9 SLEEP DISTURBANCE: ICD-10-CM

## 2025-03-27 DIAGNOSIS — R06.83 SNORING: ICD-10-CM

## 2025-03-27 DIAGNOSIS — E66.813 CLASS 3 SEVERE OBESITY DUE TO EXCESS CALORIES WITH BODY MASS INDEX (BMI) OF 40.0 TO 44.9 IN ADULT, UNSPECIFIED WHETHER SERIOUS COMORBIDITY PRESENT (HCC): Primary | ICD-10-CM

## 2025-03-27 DIAGNOSIS — E66.01 CLASS 3 SEVERE OBESITY DUE TO EXCESS CALORIES WITH BODY MASS INDEX (BMI) OF 40.0 TO 44.9 IN ADULT, UNSPECIFIED WHETHER SERIOUS COMORBIDITY PRESENT (HCC): Primary | ICD-10-CM

## 2025-03-27 DIAGNOSIS — D68.9 COAGULATION DISORDER (HCC): ICD-10-CM

## 2025-03-27 DIAGNOSIS — R53.83 OTHER FATIGUE: ICD-10-CM

## 2025-03-27 PROCEDURE — 99214 OFFICE O/P EST MOD 30 MIN: CPT | Performed by: FAMILY MEDICINE

## 2025-03-27 NOTE — ASSESSMENT & PLAN NOTE
Patient has been noting trouble with not being able to breathe during the night 3-4 months she has hx snoring dating back ? Years she has more recently been waking up feeling she is gasping , she has gained significant weight 50 lbs over the years she had  3 children , then another 40 lbs since 2019 current weight 270 lbs   She had seen cardiology 2/20/25 for CP and SOB , they felt her CP is no cardiac and recommended she lose weight , cbc , cmp 12/2024 negative ,  Recommend sleep consult and in the meant time do the best she can with portion control diet and work on cutting out sweets ,  since cardiology feels her sx are  non cardiac I urged her to begin to exercise regularly , start with walking and working up times   Orders:    Ambulatory Referral to Sleep Medicine; Future

## 2025-03-27 NOTE — ASSESSMENT & PLAN NOTE
Patient weighed 120 - 130 lbs in alter teen years , started to gradually gain weight as she had her 3 , after 3 children , after having the third baby she weighed 230 lbs in 2019 , she has gained 40 lbs since then   She admittedly indulges in sweets , she is very good with hydration water and eats balanced meals proteins fruits veggies   She has tried many  diets over the years and despite giving them time , keto ( 9 months ) she loses little weight , referral to bariatric medical team placed , in the meantime I encouraged her to work on cutting out the sweets and adding regular exercise to her routine  Orders:    Ambulatory Referral to Weight Management; Future

## 2025-03-27 NOTE — TELEPHONE ENCOUNTER
Voicemail received - patient wanted to make an appt ( patient also needs physical )            Returned call - [facility]

## 2025-03-27 NOTE — ASSESSMENT & PLAN NOTE
Patient saw hematology 11/2024 she has hx venous insufficiency had unprovoked RLE DVT , hx suggests hypercoagulable state , no fam hx of hypercoagulability , lab for antiphospholipid syndrome ordered , it was felt that she didn't have indication for systemic anticoagulation

## 2025-03-27 NOTE — PROGRESS NOTES
Name: Lindsay Oneil      : 1990      MRN: [id number]  Encounter Provider: Lisa Smith MD  Encounter Date: 3/27/2025   Encounter department: [facility]    Assessment & Plan  Class 3 severe obesity due to excess calories with body mass index (BMI) of 40.0 to 44.9 in adult, unspecified whether serious comorbidity present (HCC)    Patient weighed 120 - 130 lbs in alter teen years , started to gradually gain weight as she had her 3 , after 3 children , after having the third baby she weighed 230 lbs in 2019 , she has gained 40 lbs since then   She admittedly indulges in sweets , she is very good with hydration water and eats balanced meals proteins fruits veggies   She has tried many  diets over the years and despite giving them time , keto ( 9 months ) she loses little weight , referral to bariatric medical team placed , in the meantime I encouraged her to work on cutting out the sweets and adding regular exercise to her routine  Orders:    Ambulatory Referral to Weight Management; Future    Coagulation disorder (HCC)  Patient saw hematology 2024 she has hx venous insufficiency had unprovoked RLE DVT , hx suggests hypercoagulable state , no fam hx of hypercoagulability , lab for antiphospholipid syndrome ordered , it was felt that she didn't have indication for systemic anticoagulation       Snoring    Orders:    Ambulatory Referral to Sleep Medicine; Future    Sleep disturbance  Patient has been noting trouble with not being able to breathe during the night 3-4 months she has hx snoring dating back ? Years she has more recently been waking up feeling she is gasping , she has gained significant weight 50 lbs over the years she had  3 children , then another 40 lbs since 2019 current weight 270 lbs   She had seen cardiology 25 for CP and SOB , they felt her CP is no cardiac and recommended she lose weight , cbc , cmp 2024 negative  ,  Recommend sleep consult and in the meant time do the best she can with portion control diet and work on cutting out sweets ,  since cardiology feels her sx are  non cardiac I urged her to begin to exercise regularly , start with walking and working up times   Orders:    Ambulatory Referral to Sleep Medicine; Future    Other fatigue  Related to obesity , poor diet , deconditioning see other  Orders:    Ambulatory Referral to Sleep Medicine; Future    Ambulatory Referral to Weight Management; Future    BMI Counseling: Body mass index is 45.1 kg/m². The BMI is above normal. Nutrition recommendations include decreasing portion sizes, encouraging healthy choices of fruits and vegetables, decreasing fast food intake, consuming healthier snacks, limiting drinks that contain sugar and reducing intake of saturated and trans fat. Exercise recommendations include exercising 3-5 times per week. Rationale for BMI follow-up plan is due to patient being overweight or obese.         History of Present Illness     HPI Patient presents for same day appointment , interpretor # [id number] present during office visit she would like to discuss weight loss options and difficulty sleeping   With regard to sleep issues , she can't breathe normally , when she goes out to walk feels SOB and chest pain she saw cardiology on 2/25/25 they felt her CP is noncardiac , she can't breathe well at night 3-4 months , + snoring ,? Apnea she does  wake suddenly gasping , wakes un rested in am x months   Weight at age 17 120 130 lbs , she started to gain weight after first baby weight 180 lbs , after 2nd baby 209 , 3rd baby 2019  230 lb , she has gradually gained some weight since then she has tried multi diets , low sugar , low fat others as well , each diet she has , keto she was strict with this x 9 months but didn't lose much weight, with the diets she walks no other exercise   Current diet , water intake 6-7 bottles per day , juice 1-2 cups per day -  coffee - tea soda , regular meals she eats meats chicken fish , fruits veggies she doesn't like rice , rare bread , she is not good with sweets , she has not engaged in regular exercise   Fam history Aunt and Cousin had bariatric surgery   Review of Systems   Constitutional:  Positive for fatigue. Negative for chills and fever.   HENT:  Negative for ear pain and sore throat.    Eyes:  Negative for pain and visual disturbance.   Respiratory:  Positive for shortness of breath. Negative for cough.    Cardiovascular:  Negative for chest pain and palpitations.   Gastrointestinal:  Negative for abdominal pain and vomiting.   Genitourinary:  Negative for dysuria and hematuria.   Musculoskeletal:  Negative for arthralgias and back pain.   Skin:  Negative for color change and rash.   Neurological:  Negative for seizures and syncope.   Psychiatric/Behavioral:  Positive for sleep disturbance.    All other systems reviewed and are negative.    Past Medical History:   Diagnosis Date    Clotting disorder (HCC)      Past Surgical History:   Procedure Laterality Date     SECTION       Family History   Problem Relation Age of Onset    Asthma Mother     No Known Problems Father     No Known Problems Son     No Known Problems Daughter     No Known Problems Daughter      Social History     Tobacco Use    Smoking status: Never     Passive exposure: Never    Smokeless tobacco: Never   Vaping Use    Vaping status: Never Used   Substance and Sexual Activity    Alcohol use: Never    Drug use: Never    Sexual activity: Yes     Partners: Male     Birth control/protection: Female Sterilization     Current Outpatient Medications on File Prior to Visit   Medication Sig    clopidogrel (PLAVIX) 75 mg tablet Take 75 mg by mouth daily Plavix 75 mg once a day     No Known Allergies    There is no immunization history on file for this patient.  Objective   /85 (BP Location: Left arm, Patient Position: Sitting, Cuff Size: Large)   Pulse  "85   Temp 98.3 °F (36.8 °C) (Temporal)   Ht 5' 5\" (1.651 m)   Wt 123 kg (271 lb)   BMI 45.10 kg/m²     Physical Exam  Vitals and nursing note reviewed.   Constitutional:       General: She is not in acute distress.     Appearance: She is well-developed.      Comments: Skin with good color turgor , well hydrated ,no distress noted  obese    HENT:      Head: Normocephalic and atraumatic.      Right Ear: No decreased hearing noted. No middle ear effusion. There is no impacted cerumen.      Left Ear: No decreased hearing noted.  No middle ear effusion. There is no impacted cerumen.      Nose: No congestion or rhinorrhea.      Mouth/Throat:      Pharynx: Oropharynx is clear.   Eyes:      Conjunctiva/sclera: Conjunctivae normal.   Neck:      Thyroid: No thyromegaly.   Cardiovascular:      Rate and Rhythm: Normal rate and regular rhythm.      Heart sounds: Normal heart sounds. No murmur heard.  Pulmonary:      Effort: Pulmonary effort is normal. No respiratory distress.      Breath sounds: Normal breath sounds.   Abdominal:      Palpations: Abdomen is soft.      Tenderness: There is no abdominal tenderness.   Musculoskeletal:         General: No swelling.      Cervical back: Neck supple.   Lymphadenopathy:      Cervical:      Right cervical: No superficial cervical adenopathy.     Left cervical: No superficial cervical adenopathy.   Skin:     General: Skin is warm and dry.      Capillary Refill: Capillary refill takes less than 2 seconds.   Neurological:      Mental Status: She is alert.      Comments: Non focal exam    Psychiatric:         Mood and Affect: Mood normal.         Speech: Speech normal.         Behavior: Behavior normal.         "

## 2025-03-28 ENCOUNTER — TELEPHONE (OUTPATIENT)
Age: 35
End: 2025-03-28

## 2025-03-28 NOTE — TELEPHONE ENCOUNTER
Scheduled 4-8-2025 with dr palmer.. aware of 30 copay and the total cost of surgery would be estimated 25,000 to 30,000

## 2025-03-28 NOTE — TELEPHONE ENCOUNTER
Patient called [facility] appointment. Patient will need . Please call patient at [phone]

## 2025-04-09 ENCOUNTER — CONSULT (OUTPATIENT)
Dept: BARIATRICS | Facility: CLINIC | Age: 35
End: 2025-04-09

## 2025-04-09 VITALS
WEIGHT: 274 LBS | HEART RATE: 79 BPM | HEIGHT: 66 IN | SYSTOLIC BLOOD PRESSURE: 120 MMHG | TEMPERATURE: 97.5 F | DIASTOLIC BLOOD PRESSURE: 80 MMHG | BODY MASS INDEX: 44.03 KG/M2

## 2025-04-09 DIAGNOSIS — E66.813 CLASS 3 SEVERE OBESITY DUE TO EXCESS CALORIES WITH BODY MASS INDEX (BMI) OF 40.0 TO 44.9 IN ADULT, UNSPECIFIED WHETHER SERIOUS COMORBIDITY PRESENT: Primary | ICD-10-CM

## 2025-04-09 PROCEDURE — 99204 OFFICE O/P NEW MOD 45 MIN: CPT | Performed by: SURGERY

## 2025-04-09 NOTE — PROGRESS NOTES
"    BARIATRIC INITIAL CONSULT - BARIATRIC SURGERY    Lindsay Oneil 34 y.o. female MRN: [id number]  Unit/Bed#:  Encounter: [id number]      HPI:  Lindsay Oneil is a 34 y.o. female who presents with a longstanding history of morbid obesity and inability to sustain a meaningful weight loss.    Here today to discuss bariatric options.    Visit type: initial visit    Symptoms: excess weight and inability to loss weight    Associated Symptoms: depressed mood and anxiety    Associated Conditions: abdominal obesity  Disease Complications: none  Weight Loss Interest: high  Previous Diet Trials: low calorie     Exercise Frequency:infrequency  Types of Exercise: walking        Review of Systems   All other systems reviewed and are negative.      Historical Information   Past Medical History:   Diagnosis Date    Clotting disorder (HCC)      Past Surgical History:   Procedure Laterality Date     SECTION       Social History   Social History     Substance and Sexual Activity   Alcohol Use Never     Social History     Substance and Sexual Activity   Drug Use Never     Social History     Tobacco Use   Smoking Status Never    Passive exposure: Never   Smokeless Tobacco Never     Family History: Family history non-contributory    Meds/Allergies   all medications and allergies reviewed  No Known Allergies    Objective       Current Vitals:   Blood Pressure: 120/80 (25)  Pulse: 79 (25)  Temperature: 97.5 °F (36.4 °C) (25)  Temp Source: Tympanic (25)  Height: 5' 5.5\" (166.4 cm) (25)  Weight - Scale: 124 kg (274 lb) (25)        Invasive Devices       None                   Physical Exam  Vitals reviewed.   Constitutional:       General: She is not in acute distress.     Appearance: She is well-developed. She is not diaphoretic.   HENT:      Head: Normocephalic and atraumatic.      Right Ear: External ear normal.      Left Ear: " External ear normal.      Nose: Nose normal.   Eyes:      General: No scleral icterus.        Right eye: No discharge.         Left eye: No discharge.      Conjunctiva/sclera: Conjunctivae normal.   Neurological:      Mental Status: She is alert and oriented to person, place, and time.   Psychiatric:         Behavior: Behavior normal.         Thought Content: Thought content normal.         Judgment: Judgment normal.         Lab Results: I have personally reviewed pertinent lab results.    Imaging: Results Review Statement: No pertinent imaging studies reviewed.  EKG, Pathology, and Other Studies: Results Review Statement: No pertinent imaging studies reviewed.      Assessment/PLAN:    Class 3 severe obesity due to excess calories with body mass index (BMI) of 40.0 to 44.9 in adult (HCC)  34 y.o. female with a long standing h/o of obesity and inability to sustain any meaningful weight loss on her own despite several attempts.    She is interested in the Laparoscopic gastric bypass possible sleeve gastrectomy.  Discussed both options with her today.  He is Bangladeshi-speaking only and as she does not have insurance is looking to pay this out-of-pocket.  She has a BMI of 44.9 and I think she is an excellent candidate for the bariatric surgery to help her lose all the weight that she needs to before she develops all the associated comorbidities related to the cardiometabolic syndrome    As a part of her pre op evaluation, she will be referred to a cardiologist for risk stratification and for a sleep evaluation and consult to rule out obstructive sleep apnea if deemed necessary based on her score.    She needs an EGD to evaluate the anatomy of her GI tract prior to the operation.  I have spent over 30 minutes with her face to face in the office today discussing her options and details of the surgery. We have seen an animation of the surgery on the computer that illustrates how the operation is done and how the anatomy will  be altered with the procedure. Over 50% of this was coordinating care.    I have used the [facility] bariatric surgical risk/benefit calculator and we have discussed the results as part of the preop education.  She was given the opportunity to ask questions and I have answered all of them.  I have discussed and educated the patient with regards to the components of our multidisciplinary program and the importance of compliance and follow up in the post operative period. The patient was also instructed with regards to the importance of behavior modification, nutritional counseling, support meeting attendance and lifestyle changes that are important to ensure success.     Although there is a great statistical chance of improvement or even resolution of most of her associated comorbidities, the results vary from patient to patient and they largely depend on her commitment and compliance.     I have reviewed instructions for stopping or tapering anti-obesity medications prior to surgery.      I have encouraged her to lose 12 lbs prior to the operation.        Ciro Hillman MD  4/9/2025  10:06 AM

## 2025-04-09 NOTE — ASSESSMENT & PLAN NOTE
34 y.o. female with a long standing h/o of obesity and inability to sustain any meaningful weight loss on her own despite several attempts.    She is interested in the Laparoscopic gastric bypass possible sleeve gastrectomy.  Discussed both options with her today.  He is Maltese-speaking only and as she does not have insurance is looking to pay this out-of-pocket.    As a part of her pre op evaluation, she will be referred to a cardiologist for risk stratification and for a sleep evaluation and consult to rule out obstructive sleep apnea if deemed necessary based on her score.    She needs an EGD to evaluate the anatomy of her GI tract prior to the operation.  I have spent over 30 minutes with her face to face in the office today discussing her options and details of the surgery. We have seen an animation of the surgery on the computer that illustrates how the operation is done and how the anatomy will be altered with the procedure. Over 50% of this was coordinating care.    I have used the [facility] bariatric surgical risk/benefit calculator and we have discussed the results as part of the preop education.  She was given the opportunity to ask questions and I have answered all of them.  I have discussed and educated the patient with regards to the components of our multidisciplinary program and the importance of compliance and follow up in the post operative period. The patient was also instructed with regards to the importance of behavior modification, nutritional counseling, support meeting attendance and lifestyle changes that are important to ensure success.     Although there is a great statistical chance of improvement or even resolution of most of her associated comorbidities, the results vary from patient to patient and they largely depend on her commitment and compliance.     I have reviewed instructions for stopping or tapering anti-obesity medications prior to surgery.      I have encouraged her to lose 12  lbs prior to the operation.

## 2025-04-09 NOTE — PROGRESS NOTES
- Height/Weight:  65.5 in , 274 lb  - BMI:  44.9  - Medical conditions related to obesity: none   - Does the patient smoke/vape (nicotine, marijuana, hookah, etc): no  - StopBANG:   3/8  - Does the patient currently take a weight loss medication: no

## 2025-06-02 ENCOUNTER — OFFICE VISIT (OUTPATIENT)
Dept: INTERNAL MEDICINE CLINIC | Facility: CLINIC | Age: 35
End: 2025-06-02

## 2025-06-02 VITALS
OXYGEN SATURATION: 98 % | DIASTOLIC BLOOD PRESSURE: 70 MMHG | HEART RATE: 73 BPM | BODY MASS INDEX: 45.89 KG/M2 | WEIGHT: 280 LBS | SYSTOLIC BLOOD PRESSURE: 107 MMHG | TEMPERATURE: 98 F

## 2025-06-02 DIAGNOSIS — L81.9 PIGMENTED SKIN LESIONS: Primary | ICD-10-CM

## 2025-06-02 PROCEDURE — 99213 OFFICE O/P EST LOW 20 MIN: CPT | Performed by: FAMILY MEDICINE

## 2025-06-02 NOTE — ASSESSMENT & PLAN NOTE
See HPI patient has 2 pigmented moles develop on L cheek over the last 6 months , both are getting larger , more medially located one is larger , patient tried to remove it herself , it bled , both lesions are raised and larger of the 2 has varied dark color , recommend she refrain from any further instrumentation , dermatology referral placed   Orders:    Ambulatory Referral to Dermatology; Future

## 2025-06-02 NOTE — PROGRESS NOTES
Name: Lindsay Oneil      : 1990      MRN: [id number]  Encounter Provider: Lisa Smith MD  Encounter Date: 2025   [facility]    Assessment & Plan  Pigmented skin lesions  See HPI patient has 2 pigmented moles develop on L cheek over the last 6 months , both are getting larger , more medially located one is larger , patient tried to remove it herself , it bled , both lesions are raised and larger of the 2 has varied dark color , recommend she refrain from any further instrumentation , dermatology referral placed   Orders:    Ambulatory Referral to Dermatology; Future    BMI Counseling: Body mass index is 45.89 kg/m². The BMI is above normal. Nutrition recommendations include decreasing portion sizes, encouraging healthy choices of fruits and vegetables, decreasing fast food intake, consuming healthier snacks, limiting drinks that contain sugar and reducing intake of saturated and trans fat. Exercise recommendations include exercising 3-5 times per week. Rationale for BMI follow-up plan is due to patient being overweight or obese.         History of Present Illness     HPIPatient has growths  L cheek , interpretor #  [id number] present during office visit , 2 areas on L cheek pigmented had been present ~ 6 months ,  since 2025 , they are getting larger raised . She has no pain , - itching she tried to removed the larger one her self and it bled   - fam hx of skin cancer negative   Review of Systems   Constitutional:  Negative for chills and fever.   HENT:  Negative for ear pain and sore throat.    Eyes:  Negative for pain and visual disturbance.   Respiratory:  Negative for cough and shortness of breath.    Cardiovascular:  Negative for chest pain and palpitations.   Gastrointestinal:  Negative for abdominal pain and vomiting.   Genitourinary:  Negative for dysuria and hematuria.   Musculoskeletal:  Negative for  arthralgias and back pain.   Skin:  Negative for color change and rash.        New skin growths L cheek   Neurological:  Negative for seizures and syncope.   All other systems reviewed and are negative.    Past Medical History[1]  Past Surgical History[2]  Family History[3]  Social History[4]  Medications[5]  No Known Allergies    There is no immunization history on file for this patient.  Objective   /70 (BP Location: Left arm, Patient Position: Sitting, Cuff Size: Large)   Pulse 73   Temp 98 °F (36.7 °C) (Temporal)   Wt 127 kg (280 lb)   SpO2 98%   BMI 45.89 kg/m²     Physical Exam  Vitals and nursing note reviewed.   Constitutional:       General: She is not in acute distress.     Appearance: She is well-developed.      Comments: Skin with good color turgor , well hydrated ,no distress noted  obese    HENT:      Head: Normocephalic and atraumatic.      Right Ear: No decreased hearing noted.      Left Ear: No decreased hearing noted.     Eyes:      Conjunctiva/sclera: Conjunctivae normal.     Neck:      Thyroid: No thyromegaly.     Cardiovascular:      Rate and Rhythm: Normal rate and regular rhythm.      Heart sounds: Normal heart sounds. No murmur heard.  Pulmonary:      Effort: Pulmonary effort is normal. No respiratory distress.      Breath sounds: Normal breath sounds.   Abdominal:      Palpations: Abdomen is soft.      Tenderness: There is no abdominal tenderness.     Musculoskeletal:         General: No swelling.      Cervical back: Neck supple.   Lymphadenopathy:      Cervical:      Right cervical: No superficial or deep cervical adenopathy.     Left cervical: No superficial or deep cervical adenopathy.     Skin:     General: Skin is warm and dry.      Capillary Refill: Capillary refill takes less than 2 seconds.      Findings: Rash is not nodular, pustular, scaling or vesicular.      Comments: 2 raised round moles L cheek more medial of the 2 closer to nasal area larger varied dark color no  bleeding no crusting      Neurological:      Mental Status: She is alert.      Comments: Non focal exam   Psychiatric:         Mood and Affect: Mood normal.                [1]   Past Medical History:  Diagnosis Date    Clotting disorder (HCC)    [2]   Past Surgical History:  Procedure Laterality Date     SECTION     [3]   Family History  Problem Relation Name Age of Onset    Asthma Mother      No Known Problems Father      No Known Problems Son      No Known Problems Daughter      No Known Problems Daughter     [4]   Social History  Tobacco Use    Smoking status: Never     Passive exposure: Never    Smokeless tobacco: Never   Vaping Use    Vaping status: Never Used   Substance and Sexual Activity    Alcohol use: Never    Drug use: Never    Sexual activity: Yes     Partners: Male     Birth control/protection: Female Sterilization   [5]   Current Outpatient Medications on File Prior to Visit   Medication Sig    clopidogrel (PLAVIX) 75 mg tablet Take 75 mg by mouth daily Plavix 75 mg once a day

## 2025-06-27 ENCOUNTER — OFFICE VISIT (OUTPATIENT)
Dept: INTERNAL MEDICINE CLINIC | Facility: CLINIC | Age: 35
End: 2025-06-27

## 2025-06-27 ENCOUNTER — RESULTS FOLLOW-UP (OUTPATIENT)
Dept: NON INVASIVE DIAGNOSTICS | Facility: HOSPITAL | Age: 35
End: 2025-06-27

## 2025-06-27 ENCOUNTER — APPOINTMENT (OUTPATIENT)
Dept: LAB | Facility: CLINIC | Age: 35
End: 2025-06-27

## 2025-06-27 VITALS
BODY MASS INDEX: 44.77 KG/M2 | WEIGHT: 273.2 LBS | DIASTOLIC BLOOD PRESSURE: 77 MMHG | OXYGEN SATURATION: 98 % | SYSTOLIC BLOOD PRESSURE: 115 MMHG | TEMPERATURE: 97.3 F | HEART RATE: 82 BPM

## 2025-06-27 DIAGNOSIS — Z13.220 SCREENING, LIPID: ICD-10-CM

## 2025-06-27 DIAGNOSIS — D68.9 COAGULATION DISORDER (HCC): ICD-10-CM

## 2025-06-27 DIAGNOSIS — N92.0 MENORRHAGIA WITH REGULAR CYCLE: Primary | ICD-10-CM

## 2025-06-27 DIAGNOSIS — N92.0 MENORRHAGIA WITH REGULAR CYCLE: ICD-10-CM

## 2025-06-27 LAB
BASOPHILS # BLD AUTO: 0.01 THOUSANDS/ÂΜL (ref 0–0.1)
BASOPHILS NFR BLD AUTO: 0 % (ref 0–1)
CHOLEST SERPL-MCNC: 135 MG/DL (ref ?–200)
EOSINOPHIL # BLD AUTO: 0.16 THOUSAND/ÂΜL (ref 0–0.61)
EOSINOPHIL NFR BLD AUTO: 3 % (ref 0–6)
ERYTHROCYTE [DISTWIDTH] IN BLOOD BY AUTOMATED COUNT: 13.9 % (ref 11.6–15.1)
FERRITIN SERPL-MCNC: 12 NG/ML (ref 30–307)
HCT VFR BLD AUTO: 36.7 % (ref 34.8–46.1)
HDLC SERPL-MCNC: 44 MG/DL
HGB BLD-MCNC: 11.6 G/DL (ref 11.5–15.4)
IMM GRANULOCYTES # BLD AUTO: 0.01 THOUSAND/UL (ref 0–0.2)
IMM GRANULOCYTES NFR BLD AUTO: 0 % (ref 0–2)
LDLC SERPL CALC-MCNC: 70 MG/DL (ref 0–100)
LYMPHOCYTES # BLD AUTO: 2.34 THOUSANDS/ÂΜL (ref 0.6–4.47)
LYMPHOCYTES NFR BLD AUTO: 48 % (ref 14–44)
MCH RBC QN AUTO: 26.9 PG (ref 26.8–34.3)
MCHC RBC AUTO-ENTMCNC: 31.6 G/DL (ref 31.4–37.4)
MCV RBC AUTO: 85 FL (ref 82–98)
MONOCYTES # BLD AUTO: 0.41 THOUSAND/ÂΜL (ref 0.17–1.22)
MONOCYTES NFR BLD AUTO: 8 % (ref 4–12)
NEUTROPHILS # BLD AUTO: 2.03 THOUSANDS/ÂΜL (ref 1.85–7.62)
NEUTS SEG NFR BLD AUTO: 41 % (ref 43–75)
NONHDLC SERPL-MCNC: 91 MG/DL
NRBC BLD AUTO-RTO: 0 /100 WBCS
PLATELET # BLD AUTO: 298 THOUSANDS/UL (ref 149–390)
PMV BLD AUTO: 12 FL (ref 8.9–12.7)
RBC # BLD AUTO: 4.32 MILLION/UL (ref 3.81–5.12)
TRIGL SERPL-MCNC: 107 MG/DL (ref ?–150)
WBC # BLD AUTO: 4.96 THOUSAND/UL (ref 4.31–10.16)

## 2025-06-27 PROCEDURE — 82728 ASSAY OF FERRITIN: CPT

## 2025-06-27 PROCEDURE — 36415 COLL VENOUS BLD VENIPUNCTURE: CPT

## 2025-06-27 PROCEDURE — 80061 LIPID PANEL: CPT

## 2025-06-27 PROCEDURE — 85025 COMPLETE CBC W/AUTO DIFF WBC: CPT

## 2025-06-27 NOTE — PROGRESS NOTES
Name: Lindsay Oneil      : 1990      MRN: [id number]  Encounter Provider: Lisa Smith MD  Encounter Date: 2025   [facility]    Assessment & Plan  Menorrhagia with regular cycle  Patient continues to have heavy menses with cramping and clots , LMP -  she has photos on her phone showing large clots , she states she had passed those daily   She had seen gyn 2024 for this issue , had pelvic US on  uterine anatomy normal   She is not a candidate for OCP due to hx DVT , coagulation disorder , Mirena was discussed , patient didn't return to gyn   I am recommending that she see them , referral placed she is agreeable , check cbc , ferritin   Orders:    CBC and differential; Future    Ferritin; Future    Ambulatory Referral to Obstetrics / Gynecology; Future    Coagulation disorder (HCC)  See other hx recurring DVT , not a candidate for OCP             History of Present Illness     HPIpatient here for follow up appointment , interpretor [phone]   Menses abnormal started 2025 -  , she had heavy clotting the whole time ,she also had cramps , she has had heavy menses this was worse   She last saw gyn 2024 had US pelvis which showed normal anatomy  Not candidate for OCP due to DVT , discussion of having Mirena placed   Review of Systems  Past Medical History[1]  Past Surgical History[2]  Family History[3]  Social History[4]  Medications[5]  No Known Allergies    There is no immunization history on file for this patient.  Objective   /77 (BP Location: Right arm, Patient Position: Sitting, Cuff Size: Large)   Pulse 82   Temp (!) 97.3 °F (36.3 °C) (Temporal)   Wt 124 kg (273 lb 3.2 oz)   SpO2 98%   BMI 44.77 kg/m²     Physical Exam  Vitals and nursing note reviewed.   Constitutional:       General: She is not in acute distress.     Appearance: She is well-developed.      Comments: Skin  with good color turgor , well hydrated ,no distress noted  obese    HENT:      Head: Normocephalic and atraumatic.      Right Ear: No decreased hearing noted.      Left Ear: No decreased hearing noted.     Eyes:      Conjunctiva/sclera: Conjunctivae normal.     Neck:      Thyroid: No thyromegaly.     Cardiovascular:      Rate and Rhythm: Normal rate and regular rhythm.      Heart sounds: Normal heart sounds. No murmur heard.  Pulmonary:      Effort: Pulmonary effort is normal. No respiratory distress.      Breath sounds: Normal breath sounds.   Abdominal:      Palpations: Abdomen is soft.      Tenderness: There is no abdominal tenderness.     Musculoskeletal:         General: No swelling.      Cervical back: Neck supple.     Skin:     General: Skin is warm and dry.      Capillary Refill: Capillary refill takes less than 2 seconds.     Neurological:      Mental Status: She is alert.      Comments: Non focal exam   Psychiatric:         Attention and Perception: Attention normal.         Mood and Affect: Mood normal.         Speech: Speech normal.         Behavior: Behavior normal.                [1]   Past Medical History:  Diagnosis Date    Clotting disorder (HCC)    [2]   Past Surgical History:  Procedure Laterality Date     SECTION     [3]   Family History  Problem Relation Name Age of Onset    Asthma Mother      No Known Problems Father      No Known Problems Son      No Known Problems Daughter      No Known Problems Daughter     [4]   Social History  Tobacco Use    Smoking status: Never     Passive exposure: Never    Smokeless tobacco: Never   Vaping Use    Vaping status: Never Used   Substance and Sexual Activity    Alcohol use: Never    Drug use: Never    Sexual activity: Yes     Partners: Male     Birth control/protection: Female Sterilization   [5]   No current outpatient medications on file prior to visit.

## 2025-06-27 NOTE — ASSESSMENT & PLAN NOTE
Patient continues to have heavy menses with cramping and clots , LMP 6/2- 6/16 she has photos on her phone showing large clots , she states she had passed those daily   She had seen gyn 11/12/2024 for this issue , had pelvic US on 11/14 uterine anatomy normal   She is not a candidate for OCP due to hx DVT , coagulation disorder , Mirena was discussed , patient didn't return to gyn   I am recommending that she see them , referral placed she is agreeable , check cbc , ferritin   Orders:    CBC and differential; Future    Ferritin; Future    Ambulatory Referral to Obstetrics / Gynecology; Future

## 2025-06-30 NOTE — TELEPHONE ENCOUNTER
----- Message from Elizabeth CAMPBELL sent at 6/30/2025  8:07 AM EDT -----    ----- Message -----  From: Ashley Fried MD  Sent: 6/27/2025   4:57 PM EDT  To: [facility]    Please call patient with results (speaks Yi). Cholesterol levels look good.  ----- Message -----  From: Lab, Background User  Sent: 6/27/2025  12:37 PM EDT  To: Ashley Fried MD

## 2025-07-03 DIAGNOSIS — D50.9 IRON DEFICIENCY ANEMIA, UNSPECIFIED IRON DEFICIENCY ANEMIA TYPE: Primary | ICD-10-CM

## 2025-07-03 RX ORDER — FERROUS SULFATE 324(65)MG
TABLET, DELAYED RELEASE (ENTERIC COATED) ORAL
Qty: 30 TABLET | Refills: 5 | Status: SHIPPED | OUTPATIENT
Start: 2025-07-03

## 2025-07-16 ENCOUNTER — CONSULT (OUTPATIENT)
Dept: MULTI SPECIALTY CLINIC | Facility: CLINIC | Age: 35
End: 2025-07-16

## 2025-07-16 VITALS — WEIGHT: 267 LBS | BODY MASS INDEX: 42.91 KG/M2 | HEIGHT: 66 IN | TEMPERATURE: 98.3 F

## 2025-07-16 DIAGNOSIS — D22.39 MELANOCYTIC NEVUS OF FACE, OTHER LOCATION: Primary | ICD-10-CM

## 2025-07-16 DIAGNOSIS — L21.9 SEBORRHEIC DERMATITIS: ICD-10-CM

## 2025-07-16 PROCEDURE — 99244 OFF/OP CNSLTJ NEW/EST MOD 40: CPT | Performed by: DERMATOLOGY

## 2025-07-16 RX ORDER — MULTIVIT WITH MINERALS/LUTEIN
1000 TABLET ORAL DAILY
COMMUNITY

## 2025-07-16 RX ORDER — KETOCONAZOLE 20 MG/ML
SHAMPOO, SUSPENSION TOPICAL
Qty: 120 ML | Refills: 3 | Status: SHIPPED | OUTPATIENT
Start: 2025-07-16

## 2025-07-16 RX ORDER — KETOCONAZOLE 20 MG/G
CREAM TOPICAL
Qty: 60 G | Refills: 3 | Status: SHIPPED | OUTPATIENT
Start: 2025-07-16

## 2025-07-16 NOTE — PROGRESS NOTES
"[facility] Note     Patient Name: Lindsay Oneil  Encounter Date: 7/16/25       Have you been cared for by a [facility] Dermatologist in the last 3 years and, if so, which description applies to you? NO. I am considered a \"new\" patient and must complete all patient intake questions. I am of child-bearing potential.     REVIEW OF SYSTEMS:  Have you recently had or currently have any of the following? Recent fever or chills? No  Any non-healing wound? No  Are you pregnant or planning to become pregnant? No  Are you currently or planning to be nursing or breast feeding? No   PAST MEDICAL HISTORY:  Have you personally ever had or currently have any of the following?  If \"YES,\" then please provide more detail. Skin cancer (such as Melanoma, Basal Cell Carcinoma, Squamous Cell Carcinoma?  No  Tuberculosis, HIV/AIDS, Hepatitis B or C: No  Radiation Treatment No   HISTORY OF IMMUNOSUPPRESSION:   Do you have a history of any of the following:  Systemic Immunosuppression such as Diabetes, Biologic or Immunotherapy, Chemotherapy, Organ Transplantation, Bone Marrow Transplantation or Prednsione?  No    Answering \"YES\" requires the addition of the dotphrase \"IMMUNOSUPPRESSED\" as the first diagnosis of the patient's visit.   FAMILY HISTORY:  Any \"first degree relatives\" (parent, brother, sister, or child) with the following?    Skin Cancer, Pancreatic or Other Cancer? No   PATIENT EXPERIENCE:    Do you want the Dermatologist to perform a COMPLETE skin exam today including a clinical examination under the \"bra and underwear\" areas?  NO  If necessary, do we have your permission to call and leave a detailed message on your Preferred Phone number that includes your specific medical information?  Yes      Allergies[1] Current Medications[2]      New patient present for SOC on face x3, growing  present for December or January.    used Selin ([id number])      Whom besides the patient is " providing clinical information about today's encounter?   NO ADDITIONAL HISTORIAN (patient alone provided history)    Physical Exam and Assessment/Plan by Diagnosis:    SEBORRHEIC DERMATITIS    Physical Exam:  Anatomic Location: scalp, face, and eyebrows/eyelashes  Morphologic Description:  Erythematous plaques with greasy scale  Pertinent Positives:  Pertinent Negatives:    Additional History of Present Condition:  Patient complains of scaly patches on face and eyebrows and suffers from dandruff. She has not tried any medicated shampoos or creams.    Plan:  Ketoconazole 2% shampoo: Apply to affected area daily for 5 to 10 minutes, then rinse clear.  Topical antifungal: Ketoconazole 2% cream . Apply 1-2 times daily.    Medical Complexity:    SELF-LIMITED OR MINOR PROBLEM.  Problem runs a definite and prescribed course, is transient in nature, and is not likely to permanently alter health status.     MELANOCYTIC NEVUS    Physical Exam:  Anatomic Location Affected:   Left cheek  Morphological Description:  5mm brown macule  Pertinent Positives:  Pertinent Negatives: globular pattern on dermoscopy            Additional History of Present Condition:  Patient presents for evaluation of brown mole on her cheek. She says it has changed color and became more raised over the past 6 months. She has been applying salicylic acid to the spot and believes it has decreased in size. She says that she thinks it was a wart. She denies any family history of skin cancer, specifically melanoma.    Assessment and Plan:  Based on a thorough discussion of this condition and the management approach to it (including a comprehensive discussion of the known risks, side effects and potential benefits of treatment), the patient (family) agrees to implement the following specific plan:  Patient would like to hold off on biopsy today given lack of insurance.  Recommended patient return to clinic in 6 months for mole recheck and if mole has changed  "or has concerning features at that time will plan for biopsy of site.     Melanocytic Nevi  Melanocytic nevi (\"moles\") are tan or brown, raised or flat areas of the skin which have an increased number of melanocytes. Melanocytes are the cells in our body which make pigment and account for skin color.    Some moles are present at birth (I.e., \"congenital nevi\"), while others come up later in life (i.e., \"acquired nevi\").  The sun can stimulate the body to make more moles.  Sunburns are not the only thing that triggers more moles.  Chronic sun exposure can do it too.     Clinically distinguishing a healthy mole from melanoma may be difficult, even for experienced dermatologists. The \"ABCDE's\" of moles have been suggested as a means of helping to alert a person to a suspicious mole and the possible increased risk of melanoma.  The suggestions for raising alert are as follows:    Asymmetry: Healthy moles tend to be symmetric, while melanomas are often asymmetric.  Asymmetry means if you draw a line through the mole, the two halves do not match in color, size, shape, or surface texture. Asymmetry can be a result of rapid enlargement of a mole, the development of a raised area on a previously flat lesion, scaling, ulceration, bleeding or scabbing within the mole.  Any mole that starts to demonstrate \"asymmetry\" should be examined promptly by a board certified dermatologist.     Border: Healthy moles tend to have discrete, even borders.  The border of a melanoma often blends into the normal skin and does not sharply delineate the mole from normal skin.  Any mole that starts to demonstrate \"uneven borders\" should be examined promptly by a board certified dermatologist.     Color: Healthy moles tend to be one color throughout.  Melanomas tend to be made up of different colors ranging from dark black, blue, white, or red.  Any mole that demonstrates a color change should be examined promptly by a board certified " "dermatologist.     Diameter: Healthy moles tend to be smaller than 0.6 cm in size; an exception are \"congenital nevi\" that can be larger.  Melanomas tend to grow and can often be greater than 0.6 cm (1/4 of an inch, or the size of a pencil eraser). This is only a guideline, and many normal moles may be larger than 0.6 cm without being unhealthy.  Any mole that starts to change in size (small to bigger or bigger to smaller) should be examined promptly by a board certified dermatologist.     Evolving: Healthy moles tend to \"stay the same.\"  Melanomas may often show signs of change or evolution such as a change in size, shape, color, or elevation.  Any mole that starts to itch, bleed, crust, burn, hurt, or ulcerate or demonstrate a change or evolution should be examined promptly by a board certified dermatologist.      Dysplastic Nevi  Dysplastic moles are moles that fit the ABCDE rules of melanoma but are not identified as melanomas when examined under the microscope.  They may indicate an increased risk of melanoma in that person. If there is a family history of melanoma, most experts agree that the person may be at an increased risk for developing a melanoma.  Experts still do not agree on what dysplastic moles mean in patients without a personal or family history of melanoma.  Dysplastic moles are usually larger than common moles and have different colors within it with irregular borders. The appearance can be very similar to a melanoma. Biopsies of dysplastic moles may show abnormalities which are different from a regular mole.      Melanoma  Malignant melanoma is a type of skin cancer that can be deadly if it spreads throughout the body. The incidence of melanoma in the [place] is growing faster than any other cancer. Melanoma usually grows near the surface of the skin for a period of time, and then begins to grow deeper into the skin. Once it grows deeper into the skin, the risk of spread to other organs " "greatly increases. Therefore, early detection and removal of a malignant melanoma may result in a better chance at a complete cure; removal after the tumor has spread may not be as effective, leading to worse clinical outcomes such as death.    The true rate of nevus transformation into a melanoma is unknown. It has been estimated that the lifetime risk for any acquired melanocytic nevus on any 20-year-old individual transforming into melanoma by age 80 is 0.03% (1 in 3,164) for men and 0.009% (1 in 10,800) for women.     The appearance of a \"new mole\" remains one of the most reliable methods for identifying a malignant melanoma.  Occasionally, melanomas appear as rapidly growing, blue-black, dome-shaped bumps within a previous mole or previous area of normal skin.  Other times, melanomas are suspected when a mole suddenly appears or changes. Itching, burning, or pain in a pigmented lesion should increase suspicion, but most patients with early melanoma have no skin discomfort whatsoever.  Melanoma can occur anywhere on the skin, including areas that are difficult for self-examination. Many melanomas are first noticed by other family members.  Suspicious-looking moles may be removed for microscopic examination.       You may be able to prevent death from melanoma by doing two simple things:    Try to avoid unnecessary sun exposure and protect your skin when it is exposed to the sun.  People who live near the equator, people who have intermittent exposures to large amounts of sun, and people who have had sunburns in childhood or adolescence have an increased risk for melanoma. Sun sense and vigilant sun protection may be keys to helping to prevent melanoma.  We recommend wearing UPF-rated sun protective clothing and sunglasses whenever possible and applying a moisturizer-sunscreen combination product (SPF 50+) such as Neutrogena Daily Defense to sun exposed areas of skin at least three times a day.    Have your moles " "regularly examined by a board certified dermatologist AND by yourself or a family member/friend at home.  We recommend that you have your moles examined at least once a year by a board certified dermatologist.  Use your birthday as an annual reminder to have your \"Birthday Suit\" (I.e., your skin) examined; it is a nice birthday gift to yourself to know that your skin is healthy appearing!  Additionally, at-home self examinations may be helpful for detecting a possible melanoma.  Use the ABCDEs we discussed and check your moles once a month at home.      Chelo Quiroz MD  Dermatology, PGY-3         [1] No Known Allergies  [2]   Current Outpatient Medications:     ferrous sulfate 324 (65 Fe) mg, 1 po daily , take with vitamin C, Disp: 30 tablet, Rfl: 5    "

## 2025-07-28 ENCOUNTER — OFFICE VISIT (OUTPATIENT)
Dept: OBGYN CLINIC | Facility: CLINIC | Age: 35
End: 2025-07-28
Attending: FAMILY MEDICINE

## 2025-07-28 VITALS
HEART RATE: 77 BPM | HEIGHT: 66 IN | BODY MASS INDEX: 43.23 KG/M2 | WEIGHT: 269 LBS | DIASTOLIC BLOOD PRESSURE: 67 MMHG | SYSTOLIC BLOOD PRESSURE: 116 MMHG

## 2025-07-28 DIAGNOSIS — E66.813 CLASS 3 SEVERE OBESITY DUE TO EXCESS CALORIES WITH BODY MASS INDEX (BMI) OF 40.0 TO 44.9 IN ADULT: ICD-10-CM

## 2025-07-28 DIAGNOSIS — N92.0 MENORRHAGIA WITH REGULAR CYCLE: Primary | ICD-10-CM

## 2025-07-28 DIAGNOSIS — D50.0 IRON DEFICIENCY ANEMIA DUE TO CHRONIC BLOOD LOSS: ICD-10-CM

## 2025-07-28 PROCEDURE — 88305 TISSUE EXAM BY PATHOLOGIST: CPT | Performed by: STUDENT IN AN ORGANIZED HEALTH CARE EDUCATION/TRAINING PROGRAM

## 2025-07-28 PROCEDURE — 99213 OFFICE O/P EST LOW 20 MIN: CPT | Performed by: OBSTETRICS & GYNECOLOGY

## 2025-07-28 PROCEDURE — 58100 BIOPSY OF UTERUS LINING: CPT | Performed by: OBSTETRICS & GYNECOLOGY

## 2025-07-28 RX ORDER — ACETAMINOPHEN AND CODEINE PHOSPHATE 120; 12 MG/5ML; MG/5ML
1 SOLUTION ORAL DAILY
Qty: 28 TABLET | Refills: 2 | Status: SHIPPED | OUTPATIENT
Start: 2025-07-28

## 2025-07-29 ENCOUNTER — APPOINTMENT (OUTPATIENT)
Dept: LAB | Facility: CLINIC | Age: 35
End: 2025-07-29

## 2025-07-29 DIAGNOSIS — D50.0 IRON DEFICIENCY ANEMIA DUE TO CHRONIC BLOOD LOSS: ICD-10-CM

## 2025-07-29 DIAGNOSIS — N92.0 MENORRHAGIA WITH REGULAR CYCLE: ICD-10-CM

## 2025-07-29 LAB
PROLACTIN SERPL-MCNC: 13.42 NG/ML (ref 3.34–26.72)
TSH SERPL DL<=0.05 MIU/L-ACNC: 2.84 UIU/ML (ref 0.45–4.5)

## 2025-07-29 PROCEDURE — 36415 COLL VENOUS BLD VENIPUNCTURE: CPT

## 2025-07-29 PROCEDURE — 84146 ASSAY OF PROLACTIN: CPT

## 2025-07-29 PROCEDURE — 84443 ASSAY THYROID STIM HORMONE: CPT

## 2025-08-01 ENCOUNTER — TELEPHONE (OUTPATIENT)
Dept: OTHER | Facility: HOSPITAL | Age: 35
End: 2025-08-01

## 2025-08-01 PROCEDURE — 88305 TISSUE EXAM BY PATHOLOGIST: CPT | Performed by: STUDENT IN AN ORGANIZED HEALTH CARE EDUCATION/TRAINING PROGRAM
